# Patient Record
Sex: MALE | Race: WHITE | NOT HISPANIC OR LATINO | Employment: OTHER | URBAN - METROPOLITAN AREA
[De-identification: names, ages, dates, MRNs, and addresses within clinical notes are randomized per-mention and may not be internally consistent; named-entity substitution may affect disease eponyms.]

---

## 2023-01-01 ENCOUNTER — APPOINTMENT (OUTPATIENT)
Dept: RADIOLOGY | Facility: HOSPITAL | Age: 83
DRG: 641 | End: 2023-01-01
Payer: COMMERCIAL

## 2023-01-01 ENCOUNTER — NURSING HOME VISIT (OUTPATIENT)
Dept: POST ACUTE CARE | Facility: EXTERNAL LOCATION | Age: 83
End: 2023-01-01
Payer: COMMERCIAL

## 2023-01-01 ENCOUNTER — APPOINTMENT (OUTPATIENT)
Dept: GASTROENTEROLOGY | Facility: HOSPITAL | Age: 83
DRG: 641 | End: 2023-01-01
Payer: COMMERCIAL

## 2023-01-01 ENCOUNTER — LAB REQUISITION (OUTPATIENT)
Dept: LAB | Facility: HOSPITAL | Age: 83
End: 2023-01-01
Payer: COMMERCIAL

## 2023-01-01 ENCOUNTER — ANESTHESIA (OUTPATIENT)
Dept: GASTROENTEROLOGY | Facility: HOSPITAL | Age: 83
DRG: 641 | End: 2023-01-01
Payer: COMMERCIAL

## 2023-01-01 ENCOUNTER — HOSPITAL ENCOUNTER (INPATIENT)
Facility: HOSPITAL | Age: 83
LOS: 4 days | Discharge: NURSING, REHAB OR LONG-TERM CARE | DRG: 641 | End: 2023-12-10
Attending: EMERGENCY MEDICINE | Admitting: INTERNAL MEDICINE
Payer: COMMERCIAL

## 2023-01-01 ENCOUNTER — ANESTHESIA EVENT (OUTPATIENT)
Dept: GASTROENTEROLOGY | Facility: HOSPITAL | Age: 83
DRG: 641 | End: 2023-01-01
Payer: COMMERCIAL

## 2023-01-01 ENCOUNTER — APPOINTMENT (OUTPATIENT)
Dept: CARDIOLOGY | Facility: HOSPITAL | Age: 83
DRG: 641 | End: 2023-01-01
Payer: COMMERCIAL

## 2023-01-01 ENCOUNTER — NURSING HOME VISIT (OUTPATIENT)
Dept: POST ACUTE CARE | Facility: EXTERNAL LOCATION | Age: 83
End: 2023-01-01
Payer: MEDICARE

## 2023-01-01 ENCOUNTER — DOCUMENTATION (OUTPATIENT)
Dept: POST ACUTE CARE | Facility: EXTERNAL LOCATION | Age: 83
End: 2023-01-01
Payer: COMMERCIAL

## 2023-01-01 VITALS
DIASTOLIC BLOOD PRESSURE: 67 MMHG | HEIGHT: 70 IN | HEART RATE: 87 BPM | TEMPERATURE: 97.7 F | WEIGHT: 194 LBS | RESPIRATION RATE: 20 BRPM | SYSTOLIC BLOOD PRESSURE: 139 MMHG | OXYGEN SATURATION: 97 % | BODY MASS INDEX: 27.77 KG/M2

## 2023-01-01 DIAGNOSIS — M10.9 GOUT, UNSPECIFIED CAUSE, UNSPECIFIED CHRONICITY, UNSPECIFIED SITE: ICD-10-CM

## 2023-01-01 DIAGNOSIS — R62.7 FTT (FAILURE TO THRIVE) IN ADULT: Primary | ICD-10-CM

## 2023-01-01 DIAGNOSIS — I10 HYPERTENSION, UNSPECIFIED TYPE: Primary | ICD-10-CM

## 2023-01-01 DIAGNOSIS — I10 HYPERTENSION, UNSPECIFIED TYPE: ICD-10-CM

## 2023-01-01 DIAGNOSIS — I73.9 PVD (PERIPHERAL VASCULAR DISEASE) (CMS-HCC): ICD-10-CM

## 2023-01-01 DIAGNOSIS — R10.9 ABDOMINAL PAIN, UNSPECIFIED ABDOMINAL LOCATION: ICD-10-CM

## 2023-01-01 DIAGNOSIS — R62.7 FTT (FAILURE TO THRIVE) IN ADULT: ICD-10-CM

## 2023-01-01 DIAGNOSIS — E53.8 B12 DEFICIENCY: ICD-10-CM

## 2023-01-01 DIAGNOSIS — L03.119 CELLULITIS OF LOWER EXTREMITY, UNSPECIFIED LATERALITY: ICD-10-CM

## 2023-01-01 DIAGNOSIS — N18.9 CHRONIC KIDNEY DISEASE, UNSPECIFIED CKD STAGE: ICD-10-CM

## 2023-01-01 DIAGNOSIS — R63.4 ABNORMAL WEIGHT LOSS: Primary | ICD-10-CM

## 2023-01-01 DIAGNOSIS — N39.0 URINARY TRACT INFECTION WITHOUT HEMATURIA, SITE UNSPECIFIED: ICD-10-CM

## 2023-01-01 DIAGNOSIS — R13.10 DYSPHAGIA, UNSPECIFIED TYPE: Primary | ICD-10-CM

## 2023-01-01 DIAGNOSIS — R41.82 ALTERED MENTAL STATUS, UNSPECIFIED: ICD-10-CM

## 2023-01-01 DIAGNOSIS — I73.9 PVD (PERIPHERAL VASCULAR DISEASE) (CMS-HCC): Primary | ICD-10-CM
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
## 2023-01-01 DIAGNOSIS — E86.0 DEHYDRATION: Primary | ICD-10-CM

## 2023-01-01 DIAGNOSIS — R53.1 WEAKNESS: Primary | ICD-10-CM

## 2023-01-01 DIAGNOSIS — S81.812A NONINFECTED SKIN TEAR OF LOWER EXTREMITY, LEFT, INITIAL ENCOUNTER: ICD-10-CM

## 2023-01-01 DIAGNOSIS — M54.2 NECK PAIN: ICD-10-CM

## 2023-01-01 DIAGNOSIS — R50.9 FEVER, UNSPECIFIED FEVER CAUSE: ICD-10-CM

## 2023-01-01 DIAGNOSIS — R51.9 NONINTRACTABLE EPISODIC HEADACHE, UNSPECIFIED HEADACHE TYPE: ICD-10-CM

## 2023-01-01 DIAGNOSIS — L03.90 CELLULITIS, UNSPECIFIED CELLULITIS SITE: Primary | ICD-10-CM

## 2023-01-01 DIAGNOSIS — N17.9 ACUTE KIDNEY INJURY (CMS-HCC): ICD-10-CM

## 2023-01-01 DIAGNOSIS — R79.89 ELEVATED TROPONIN: ICD-10-CM

## 2023-01-01 DIAGNOSIS — S91.319A CUT OF FOOT: ICD-10-CM

## 2023-01-01 DIAGNOSIS — R62.7 FAILURE TO THRIVE IN ADULT: ICD-10-CM

## 2023-01-01 DIAGNOSIS — R53.1 WEAKNESS: ICD-10-CM

## 2023-01-01 DIAGNOSIS — N17.9 AKI (ACUTE KIDNEY INJURY) (CMS-HCC): Primary | ICD-10-CM

## 2023-01-01 DIAGNOSIS — K21.9 GASTROESOPHAGEAL REFLUX DISEASE WITHOUT ESOPHAGITIS: Primary | ICD-10-CM

## 2023-01-01 LAB
ALBUMIN SERPL BCP-MCNC: 2.6 G/DL (ref 3.4–5)
ALP SERPL-CCNC: 73 U/L (ref 33–136)
ALT SERPL W P-5'-P-CCNC: 14 U/L (ref 10–52)
ANION GAP IN SER/PLAS: 11 MMOL/L (ref 10–20)
ANION GAP SERPL CALC-SCNC: 10 MMOL/L (ref 10–20)
ANION GAP SERPL CALC-SCNC: 10 MMOL/L (ref 10–20)
ANION GAP SERPL CALC-SCNC: 12 MMOL/L (ref 10–20)
ANION GAP SERPL CALC-SCNC: 14 MMOL/L (ref 10–20)
ANION GAP SERPL CALC-SCNC: 14 MMOL/L (ref 10–20)
ANION GAP SERPL CALC-SCNC: 15 MMOL/L (ref 10–20)
ANION GAP SERPL CALC-SCNC: 15 MMOL/L (ref 10–20)
APPEARANCE UR: ABNORMAL
AST SERPL W P-5'-P-CCNC: 25 U/L (ref 9–39)
BACTERIA UR CULT: NO GROWTH
BASE EXCESS BLDV CALC-SCNC: -3.6 MMOL/L (ref -2–3)
BASOPHILS # BLD AUTO: 0.03 X10*3/UL (ref 0–0.1)
BASOPHILS # BLD AUTO: 0.06 X10*3/UL (ref 0–0.1)
BASOPHILS NFR BLD AUTO: 0.3 %
BASOPHILS NFR BLD AUTO: 0.7 %
BILIRUB SERPL-MCNC: 0.5 MG/DL (ref 0–1.2)
BILIRUB UR STRIP.AUTO-MCNC: NEGATIVE MG/DL
BODY TEMPERATURE: 37 DEGREES CELSIUS
BUN SERPL-MCNC: 24 MG/DL (ref 6–23)
BUN SERPL-MCNC: 29 MG/DL (ref 6–23)
BUN SERPL-MCNC: 41 MG/DL (ref 6–23)
BUN SERPL-MCNC: 54 MG/DL (ref 6–23)
BUN SERPL-MCNC: 58 MG/DL (ref 6–23)
BUN SERPL-MCNC: 61 MG/DL (ref 6–23)
BUN SERPL-MCNC: 63 MG/DL (ref 6–23)
CALCIUM (MG/DL) IN SER/PLAS: 9.7 MG/DL (ref 8.6–10.3)
CALCIUM SERPL-MCNC: 8 MG/DL (ref 8.6–10.3)
CALCIUM SERPL-MCNC: 8.1 MG/DL (ref 8.6–10.3)
CALCIUM SERPL-MCNC: 8.2 MG/DL (ref 8.6–10.3)
CALCIUM SERPL-MCNC: 8.6 MG/DL (ref 8.6–10.3)
CALCIUM SERPL-MCNC: 8.9 MG/DL (ref 8.6–10.3)
CALCIUM SERPL-MCNC: 9.2 MG/DL (ref 8.6–10.3)
CALCIUM SERPL-MCNC: 9.4 MG/DL (ref 8.6–10.3)
CARBON DIOXIDE, TOTAL (MMOL/L) IN SER/PLAS: 28 MMOL/L (ref 21–32)
CARDIAC TROPONIN I PNL SERPL HS: 60 NG/L (ref 0–20)
CARDIAC TROPONIN I PNL SERPL HS: 80 NG/L (ref 0–20)
CHLORIDE (MMOL/L) IN SER/PLAS: 103 MMOL/L (ref 98–107)
CHLORIDE SERPL-SCNC: 102 MMOL/L (ref 98–107)
CHLORIDE SERPL-SCNC: 106 MMOL/L (ref 98–107)
CHLORIDE SERPL-SCNC: 110 MMOL/L (ref 98–107)
CHLORIDE SERPL-SCNC: 113 MMOL/L (ref 98–107)
CHLORIDE SERPL-SCNC: 117 MMOL/L (ref 98–107)
CHLORIDE SERPL-SCNC: 118 MMOL/L (ref 98–107)
CHLORIDE SERPL-SCNC: 99 MMOL/L (ref 98–107)
CO2 SERPL-SCNC: 17 MMOL/L (ref 21–32)
CO2 SERPL-SCNC: 18 MMOL/L (ref 21–32)
CO2 SERPL-SCNC: 18 MMOL/L (ref 21–32)
CO2 SERPL-SCNC: 19 MMOL/L (ref 21–32)
CO2 SERPL-SCNC: 19 MMOL/L (ref 21–32)
CO2 SERPL-SCNC: 20 MMOL/L (ref 21–32)
CO2 SERPL-SCNC: 23 MMOL/L (ref 21–32)
COLOR UR: ABNORMAL
CREAT SERPL-MCNC: 1.54 MG/DL (ref 0.5–1.3)
CREAT SERPL-MCNC: 1.61 MG/DL (ref 0.5–1.3)
CREAT SERPL-MCNC: 1.91 MG/DL (ref 0.5–1.3)
CREAT SERPL-MCNC: 1.97 MG/DL (ref 0.5–1.3)
CREAT SERPL-MCNC: 2.07 MG/DL (ref 0.5–1.3)
CREAT SERPL-MCNC: 2.38 MG/DL (ref 0.5–1.3)
CREAT SERPL-MCNC: 2.49 MG/DL (ref 0.5–1.3)
CREATININE (MG/DL) IN SER/PLAS: 2.03 MG/DL (ref 0.5–1.3)
EOSINOPHIL # BLD AUTO: 0.08 X10*3/UL (ref 0–0.4)
EOSINOPHIL # BLD AUTO: 0.21 X10*3/UL (ref 0–0.4)
EOSINOPHIL NFR BLD AUTO: 0.9 %
EOSINOPHIL NFR BLD AUTO: 2.5 %
ERYTHROCYTE DISTRIBUTION WIDTH (RATIO) BY AUTOMATED COUNT: 14.6 % (ref 11.5–14.5)
ERYTHROCYTE DISTRIBUTION WIDTH (RATIO) BY AUTOMATED COUNT: 14.6 % (ref 11.5–14.5)
ERYTHROCYTE MEAN CORPUSCULAR HEMOGLOBIN CONCENTRATION (G/DL) BY AUTOMATED: 30.9 G/DL (ref 32–36)
ERYTHROCYTE MEAN CORPUSCULAR HEMOGLOBIN CONCENTRATION (G/DL) BY AUTOMATED: 31.5 G/DL (ref 32–36)
ERYTHROCYTE MEAN CORPUSCULAR VOLUME (FL) BY AUTOMATED COUNT: 85 FL (ref 80–100)
ERYTHROCYTE MEAN CORPUSCULAR VOLUME (FL) BY AUTOMATED COUNT: 91 FL (ref 80–100)
ERYTHROCYTE [DISTWIDTH] IN BLOOD BY AUTOMATED COUNT: 14.8 % (ref 11.5–14.5)
ERYTHROCYTE [DISTWIDTH] IN BLOOD BY AUTOMATED COUNT: 15.8 % (ref 11.5–14.5)
ERYTHROCYTE [DISTWIDTH] IN BLOOD BY AUTOMATED COUNT: 15.8 % (ref 11.5–14.5)
ERYTHROCYTE [DISTWIDTH] IN BLOOD BY AUTOMATED COUNT: 16.1 % (ref 11.5–14.5)
ERYTHROCYTE [DISTWIDTH] IN BLOOD BY AUTOMATED COUNT: 16.2 % (ref 11.5–14.5)
ERYTHROCYTE [DISTWIDTH] IN BLOOD BY AUTOMATED COUNT: 16.3 % (ref 11.5–14.5)
ERYTHROCYTES (10*6/UL) IN BLOOD BY AUTOMATED COUNT: 3.12 X10E12/L (ref 4.5–5.9)
ERYTHROCYTES (10*6/UL) IN BLOOD BY AUTOMATED COUNT: 3.7 X10E12/L (ref 4.5–5.9)
GFR MALE: 32 ML/MIN/1.73M2
GFR SERPL CREATININE-BSD FRML MDRD: 25 ML/MIN/1.73M*2
GFR SERPL CREATININE-BSD FRML MDRD: 26 ML/MIN/1.73M*2
GFR SERPL CREATININE-BSD FRML MDRD: 31 ML/MIN/1.73M*2
GFR SERPL CREATININE-BSD FRML MDRD: 33 ML/MIN/1.73M*2
GFR SERPL CREATININE-BSD FRML MDRD: 34 ML/MIN/1.73M*2
GFR SERPL CREATININE-BSD FRML MDRD: 42 ML/MIN/1.73M*2
GFR SERPL CREATININE-BSD FRML MDRD: 44 ML/MIN/1.73M*2
GLUCOSE (MG/DL) IN SER/PLAS: 128 MG/DL (ref 74–99)
GLUCOSE BLD MANUAL STRIP-MCNC: 73 MG/DL (ref 74–99)
GLUCOSE BLD MANUAL STRIP-MCNC: 75 MG/DL (ref 74–99)
GLUCOSE BLD MANUAL STRIP-MCNC: 98 MG/DL (ref 74–99)
GLUCOSE SERPL-MCNC: 100 MG/DL (ref 74–99)
GLUCOSE SERPL-MCNC: 110 MG/DL (ref 74–99)
GLUCOSE SERPL-MCNC: 110 MG/DL (ref 74–99)
GLUCOSE SERPL-MCNC: 112 MG/DL (ref 74–99)
GLUCOSE SERPL-MCNC: 117 MG/DL (ref 74–99)
GLUCOSE SERPL-MCNC: 121 MG/DL (ref 74–99)
GLUCOSE SERPL-MCNC: 85 MG/DL (ref 74–99)
GLUCOSE UR STRIP.AUTO-MCNC: NEGATIVE MG/DL
HCO3 BLDV-SCNC: 20.6 MMOL/L (ref 22–26)
HCT VFR BLD AUTO: 26.9 % (ref 41–52)
HCT VFR BLD AUTO: 27.4 % (ref 41–52)
HCT VFR BLD AUTO: 27.5 % (ref 41–52)
HCT VFR BLD AUTO: 28.8 % (ref 41–52)
HCT VFR BLD AUTO: 29 % (ref 41–52)
HCT VFR BLD AUTO: 31.7 % (ref 41–52)
HEMATOCRIT (%) IN BLOOD BY AUTOMATED COUNT: 26.5 % (ref 41–52)
HEMATOCRIT (%) IN BLOOD BY AUTOMATED COUNT: 33.6 % (ref 41–52)
HEMOGLOBIN (G/DL) IN BLOOD: 10.6 G/DL (ref 13.5–17.5)
HEMOGLOBIN (G/DL) IN BLOOD: 8.2 G/DL (ref 13.5–17.5)
HGB BLD-MCNC: 8.4 G/DL (ref 13.5–17.5)
HGB BLD-MCNC: 8.7 G/DL (ref 13.5–17.5)
HGB BLD-MCNC: 8.7 G/DL (ref 13.5–17.5)
HGB BLD-MCNC: 9 G/DL (ref 13.5–17.5)
HGB BLD-MCNC: 9.2 G/DL (ref 13.5–17.5)
HGB BLD-MCNC: 9.8 G/DL (ref 13.5–17.5)
HOLD SPECIMEN: NORMAL
IMM GRANULOCYTES # BLD AUTO: 0.06 X10*3/UL (ref 0–0.5)
IMM GRANULOCYTES # BLD AUTO: 0.09 X10*3/UL (ref 0–0.5)
IMM GRANULOCYTES NFR BLD AUTO: 0.7 % (ref 0–0.9)
IMM GRANULOCYTES NFR BLD AUTO: 1.1 % (ref 0–0.9)
INHALED O2 CONCENTRATION: 21 %
KETONES UR STRIP.AUTO-MCNC: NEGATIVE MG/DL
LACTATE SERPL-SCNC: 1.1 MMOL/L (ref 0.4–2)
LEUKOCYTE ESTERASE UR QL STRIP.AUTO: ABNORMAL
LEUKOCYTES (10*3/UL) IN BLOOD BY AUTOMATED COUNT: 5.3 X10E9/L (ref 4.4–11.3)
LEUKOCYTES (10*3/UL) IN BLOOD BY AUTOMATED COUNT: 9.9 X10E9/L (ref 4.4–11.3)
LIPASE SERPL-CCNC: 36 U/L (ref 9–82)
LYMPHOCYTES # BLD AUTO: 0.81 X10*3/UL (ref 0.8–3)
LYMPHOCYTES # BLD AUTO: 1.13 X10*3/UL (ref 0.8–3)
LYMPHOCYTES NFR BLD AUTO: 13.5 %
LYMPHOCYTES NFR BLD AUTO: 9.4 %
MAGNESIUM SERPL-MCNC: 1.37 MG/DL (ref 1.6–2.4)
MAGNESIUM SERPL-MCNC: 1.43 MG/DL (ref 1.6–2.4)
MCH RBC QN AUTO: 26.4 PG (ref 26–34)
MCH RBC QN AUTO: 26.8 PG (ref 26–34)
MCH RBC QN AUTO: 26.8 PG (ref 26–34)
MCH RBC QN AUTO: 26.9 PG (ref 26–34)
MCH RBC QN AUTO: 26.9 PG (ref 26–34)
MCH RBC QN AUTO: 27 PG (ref 26–34)
MCHC RBC AUTO-ENTMCNC: 30.9 G/DL (ref 32–36)
MCHC RBC AUTO-ENTMCNC: 31.2 G/DL (ref 32–36)
MCHC RBC AUTO-ENTMCNC: 31.3 G/DL (ref 32–36)
MCHC RBC AUTO-ENTMCNC: 31.6 G/DL (ref 32–36)
MCHC RBC AUTO-ENTMCNC: 31.7 G/DL (ref 32–36)
MCHC RBC AUTO-ENTMCNC: 31.8 G/DL (ref 32–36)
MCV RBC AUTO: 84 FL (ref 80–100)
MCV RBC AUTO: 85 FL (ref 80–100)
MCV RBC AUTO: 85 FL (ref 80–100)
MCV RBC AUTO: 86 FL (ref 80–100)
MCV RBC AUTO: 86 FL (ref 80–100)
MCV RBC AUTO: 87 FL (ref 80–100)
MONOCYTES # BLD AUTO: 0.73 X10*3/UL (ref 0.05–0.8)
MONOCYTES # BLD AUTO: 0.9 X10*3/UL (ref 0.05–0.8)
MONOCYTES NFR BLD AUTO: 10.4 %
MONOCYTES NFR BLD AUTO: 8.7 %
NEUTROPHILS # BLD AUTO: 6.15 X10*3/UL (ref 1.6–5.5)
NEUTROPHILS # BLD AUTO: 6.75 X10*3/UL (ref 1.6–5.5)
NEUTROPHILS NFR BLD AUTO: 73.5 %
NEUTROPHILS NFR BLD AUTO: 78.3 %
NITRITE UR QL STRIP.AUTO: NEGATIVE
NRBC (PER 100 WBCS) BY AUTOMATED COUNT: 0 /100 WBC (ref 0–0)
NRBC (PER 100 WBCS) BY AUTOMATED COUNT: 0.3 /100 WBC (ref 0–0)
NRBC BLD-RTO: 0 /100 WBCS (ref 0–0)
OXYHGB MFR BLDV: 47.5 % (ref 45–75)
PCO2 BLDV: 34 MM HG (ref 41–51)
PH BLDV: 7.39 PH (ref 7.33–7.43)
PH UR STRIP.AUTO: 6 [PH]
PHOSPHATE SERPL-MCNC: 2.1 MG/DL (ref 2.5–4.9)
PHOSPHATE SERPL-MCNC: 2.3 MG/DL (ref 2.5–4.9)
PLATELET # BLD AUTO: 222 X10*3/UL (ref 150–450)
PLATELET # BLD AUTO: 240 X10*3/UL (ref 150–450)
PLATELET # BLD AUTO: 248 X10*3/UL (ref 150–450)
PLATELET # BLD AUTO: 260 X10*3/UL (ref 150–450)
PLATELET # BLD AUTO: 270 X10*3/UL (ref 150–450)
PLATELET # BLD AUTO: 287 X10*3/UL (ref 150–450)
PLATELETS (10*3/UL) IN BLOOD AUTOMATED COUNT: 233 X10E9/L (ref 150–450)
PLATELETS (10*3/UL) IN BLOOD AUTOMATED COUNT: 300 X10E9/L (ref 150–450)
PO2 BLDV: 27 MM HG (ref 35–45)
POTASSIUM (MMOL/L) IN SER/PLAS: 4 MMOL/L (ref 3.5–5.3)
POTASSIUM SERPL-SCNC: 2.9 MMOL/L (ref 3.5–5.3)
POTASSIUM SERPL-SCNC: 3.3 MMOL/L (ref 3.5–5.3)
POTASSIUM SERPL-SCNC: 3.3 MMOL/L (ref 3.5–5.3)
POTASSIUM SERPL-SCNC: 3.4 MMOL/L (ref 3.5–5.3)
POTASSIUM SERPL-SCNC: 3.6 MMOL/L (ref 3.5–5.3)
POTASSIUM SERPL-SCNC: 3.9 MMOL/L (ref 3.5–5.3)
POTASSIUM SERPL-SCNC: 4.8 MMOL/L (ref 3.5–5.3)
PROT SERPL-MCNC: 6.1 G/DL (ref 6.4–8.2)
PROT UR STRIP.AUTO-MCNC: ABNORMAL MG/DL
RBC # BLD AUTO: 3.13 X10*6/UL (ref 4.5–5.9)
RBC # BLD AUTO: 3.24 X10*6/UL (ref 4.5–5.9)
RBC # BLD AUTO: 3.29 X10*6/UL (ref 4.5–5.9)
RBC # BLD AUTO: 3.35 X10*6/UL (ref 4.5–5.9)
RBC # BLD AUTO: 3.41 X10*6/UL (ref 4.5–5.9)
RBC # BLD AUTO: 3.65 X10*6/UL (ref 4.5–5.9)
RBC # UR STRIP.AUTO: ABNORMAL /UL
RBC #/AREA URNS AUTO: >20 /HPF
SAO2 % BLDV: 49 % (ref 45–75)
SARS-COV-2 RNA RESP QL NAA+PROBE: NOT DETECTED
SODIUM (MMOL/L) IN SER/PLAS: 138 MMOL/L (ref 136–145)
SODIUM SERPL-SCNC: 129 MMOL/L (ref 136–145)
SODIUM SERPL-SCNC: 131 MMOL/L (ref 136–145)
SODIUM SERPL-SCNC: 135 MMOL/L (ref 136–145)
SODIUM SERPL-SCNC: 137 MMOL/L (ref 136–145)
SODIUM SERPL-SCNC: 143 MMOL/L (ref 136–145)
SODIUM SERPL-SCNC: 143 MMOL/L (ref 136–145)
SODIUM SERPL-SCNC: 146 MMOL/L (ref 136–145)
SP GR UR STRIP.AUTO: 1.02
TEST COMMENT: ABNORMAL
UREA NITROGEN (MG/DL) IN SER/PLAS: 42 MG/DL (ref 6–23)
UROBILINOGEN UR STRIP.AUTO-MCNC: ABNORMAL MG/DL
WBC # BLD AUTO: 6.9 X10*3/UL (ref 4.4–11.3)
WBC # BLD AUTO: 7.3 X10*3/UL (ref 4.4–11.3)
WBC # BLD AUTO: 7.4 X10*3/UL (ref 4.4–11.3)
WBC # BLD AUTO: 8 X10*3/UL (ref 4.4–11.3)
WBC # BLD AUTO: 8.4 X10*3/UL (ref 4.4–11.3)
WBC # BLD AUTO: 8.6 X10*3/UL (ref 4.4–11.3)
WBC #/AREA URNS AUTO: ABNORMAL /HPF

## 2023-01-01 PROCEDURE — 3700000001 HC GENERAL ANESTHESIA TIME - INITIAL BASE CHARGE: Performed by: SURGERY

## 2023-01-01 PROCEDURE — 87635 SARS-COV-2 COVID-19 AMP PRB: CPT | Performed by: EMERGENCY MEDICINE

## 2023-01-01 PROCEDURE — 96361 HYDRATE IV INFUSION ADD-ON: CPT

## 2023-01-01 PROCEDURE — 72125 CT NECK SPINE W/O DYE: CPT | Performed by: RADIOLOGY

## 2023-01-01 PROCEDURE — 80048 BASIC METABOLIC PNL TOTAL CA: CPT | Performed by: INTERNAL MEDICINE

## 2023-01-01 PROCEDURE — 81001 URINALYSIS AUTO W/SCOPE: CPT | Performed by: EMERGENCY MEDICINE

## 2023-01-01 PROCEDURE — 2500000004 HC RX 250 GENERAL PHARMACY W/ HCPCS (ALT 636 FOR OP/ED): Performed by: INTERNAL MEDICINE

## 2023-01-01 PROCEDURE — 0DH63UZ INSERTION OF FEEDING DEVICE INTO STOMACH, PERCUTANEOUS APPROACH: ICD-10-PCS | Performed by: SURGERY

## 2023-01-01 PROCEDURE — 99222 1ST HOSP IP/OBS MODERATE 55: CPT

## 2023-01-01 PROCEDURE — 99308 SBSQ NF CARE LOW MDM 20: CPT | Performed by: INTERNAL MEDICINE

## 2023-01-01 PROCEDURE — 2500000004 HC RX 250 GENERAL PHARMACY W/ HCPCS (ALT 636 FOR OP/ED): Performed by: PHYSICIAN ASSISTANT

## 2023-01-01 PROCEDURE — 99309 SBSQ NF CARE MODERATE MDM 30: CPT | Performed by: INTERNAL MEDICINE

## 2023-01-01 PROCEDURE — 3700000002 HC GENERAL ANESTHESIA TIME - EACH INCREMENTAL 1 MINUTE: Performed by: SURGERY

## 2023-01-01 PROCEDURE — A43239 PR EDG TRANSORAL BIOPSY SINGLE/MULTIPLE: Performed by: ANESTHESIOLOGY

## 2023-01-01 PROCEDURE — 7100000002 HC RECOVERY ROOM TIME - EACH INCREMENTAL 1 MINUTE: Performed by: SURGERY

## 2023-01-01 PROCEDURE — 99232 SBSQ HOSP IP/OBS MODERATE 35: CPT | Performed by: INTERNAL MEDICINE

## 2023-01-01 PROCEDURE — 87086 URINE CULTURE/COLONY COUNT: CPT | Mod: PARLAB | Performed by: EMERGENCY MEDICINE

## 2023-01-01 PROCEDURE — 99100 ANES PT EXTEME AGE<1 YR&>70: CPT | Performed by: ANESTHESIOLOGY

## 2023-01-01 PROCEDURE — 36415 COLL VENOUS BLD VENIPUNCTURE: CPT | Performed by: INTERNAL MEDICINE

## 2023-01-01 PROCEDURE — 43246 EGD PLACE GASTROSTOMY TUBE: CPT | Performed by: SURGERY

## 2023-01-01 PROCEDURE — 82947 ASSAY GLUCOSE BLOOD QUANT: CPT

## 2023-01-01 PROCEDURE — 2500000005 HC RX 250 GENERAL PHARMACY W/O HCPCS: Performed by: PHYSICIAN ASSISTANT

## 2023-01-01 PROCEDURE — 70450 CT HEAD/BRAIN W/O DYE: CPT

## 2023-01-01 PROCEDURE — 99233 SBSQ HOSP IP/OBS HIGH 50: CPT | Performed by: INTERNAL MEDICINE

## 2023-01-01 PROCEDURE — 2500000005 HC RX 250 GENERAL PHARMACY W/O HCPCS: Performed by: EMERGENCY MEDICINE

## 2023-01-01 PROCEDURE — 85025 COMPLETE CBC W/AUTO DIFF WBC: CPT

## 2023-01-01 PROCEDURE — 2500000001 HC RX 250 WO HCPCS SELF ADMINISTERED DRUGS (ALT 637 FOR MEDICARE OP): Performed by: PHYSICIAN ASSISTANT

## 2023-01-01 PROCEDURE — 83735 ASSAY OF MAGNESIUM: CPT | Performed by: INTERNAL MEDICINE

## 2023-01-01 PROCEDURE — 83690 ASSAY OF LIPASE: CPT | Performed by: EMERGENCY MEDICINE

## 2023-01-01 PROCEDURE — 93005 ELECTROCARDIOGRAM TRACING: CPT

## 2023-01-01 PROCEDURE — 2060000001 HC INTERMEDIATE ICU ROOM DAILY

## 2023-01-01 PROCEDURE — 2780000003 HC OR 278 NO HCPCS: Performed by: SURGERY

## 2023-01-01 PROCEDURE — 84484 ASSAY OF TROPONIN QUANT: CPT | Performed by: EMERGENCY MEDICINE

## 2023-01-01 PROCEDURE — 99222 1ST HOSP IP/OBS MODERATE 55: CPT | Performed by: PHYSICIAN ASSISTANT

## 2023-01-01 PROCEDURE — 99222 1ST HOSP IP/OBS MODERATE 55: CPT | Performed by: INTERNAL MEDICINE

## 2023-01-01 PROCEDURE — 85027 COMPLETE CBC AUTOMATED: CPT | Performed by: INTERNAL MEDICINE

## 2023-01-01 PROCEDURE — 2500000001 HC RX 250 WO HCPCS SELF ADMINISTERED DRUGS (ALT 637 FOR MEDICARE OP): Performed by: INTERNAL MEDICINE

## 2023-01-01 PROCEDURE — 99308 SBSQ NF CARE LOW MDM 20: CPT | Performed by: REGISTERED NURSE

## 2023-01-01 PROCEDURE — 96375 TX/PRO/DX INJ NEW DRUG ADDON: CPT

## 2023-01-01 PROCEDURE — 96376 TX/PRO/DX INJ SAME DRUG ADON: CPT

## 2023-01-01 PROCEDURE — 2500000004 HC RX 250 GENERAL PHARMACY W/ HCPCS (ALT 636 FOR OP/ED): Performed by: EMERGENCY MEDICINE

## 2023-01-01 PROCEDURE — 83605 ASSAY OF LACTIC ACID: CPT | Performed by: EMERGENCY MEDICINE

## 2023-01-01 PROCEDURE — 71045 X-RAY EXAM CHEST 1 VIEW: CPT | Mod: FY

## 2023-01-01 PROCEDURE — G0378 HOSPITAL OBSERVATION PER HR: HCPCS

## 2023-01-01 PROCEDURE — 84132 ASSAY OF SERUM POTASSIUM: CPT | Performed by: INTERNAL MEDICINE

## 2023-01-01 PROCEDURE — 43246 EGD PLACE GASTROSTOMY TUBE: CPT | Performed by: STUDENT IN AN ORGANIZED HEALTH CARE EDUCATION/TRAINING PROGRAM

## 2023-01-01 PROCEDURE — 85027 COMPLETE CBC AUTOMATED: CPT | Performed by: PHYSICIAN ASSISTANT

## 2023-01-01 PROCEDURE — 71045 X-RAY EXAM CHEST 1 VIEW: CPT | Performed by: RADIOLOGY

## 2023-01-01 PROCEDURE — 96365 THER/PROPH/DIAG IV INF INIT: CPT

## 2023-01-01 PROCEDURE — 74176 CT ABD & PELVIS W/O CONTRAST: CPT | Performed by: RADIOLOGY

## 2023-01-01 PROCEDURE — 2500000004 HC RX 250 GENERAL PHARMACY W/ HCPCS (ALT 636 FOR OP/ED): Performed by: ANESTHESIOLOGIST ASSISTANT

## 2023-01-01 PROCEDURE — A43239 PR EDG TRANSORAL BIOPSY SINGLE/MULTIPLE: Performed by: ANESTHESIOLOGIST ASSISTANT

## 2023-01-01 PROCEDURE — 2500000005 HC RX 250 GENERAL PHARMACY W/O HCPCS: Performed by: ANESTHESIOLOGIST ASSISTANT

## 2023-01-01 PROCEDURE — 96366 THER/PROPH/DIAG IV INF ADDON: CPT

## 2023-01-01 PROCEDURE — 80053 COMPREHEN METABOLIC PANEL: CPT | Performed by: EMERGENCY MEDICINE

## 2023-01-01 PROCEDURE — 85025 COMPLETE CBC W/AUTO DIFF WBC: CPT | Performed by: EMERGENCY MEDICINE

## 2023-01-01 PROCEDURE — 7100000001 HC RECOVERY ROOM TIME - INITIAL BASE CHARGE: Performed by: SURGERY

## 2023-01-01 PROCEDURE — 92610 EVALUATE SWALLOWING FUNCTION: CPT | Mod: GN

## 2023-01-01 PROCEDURE — 36415 COLL VENOUS BLD VENIPUNCTURE: CPT | Performed by: EMERGENCY MEDICINE

## 2023-01-01 PROCEDURE — 74176 CT ABD & PELVIS W/O CONTRAST: CPT

## 2023-01-01 PROCEDURE — 2720000007 HC OR 272 NO HCPCS: Performed by: SURGERY

## 2023-01-01 PROCEDURE — 70450 CT HEAD/BRAIN W/O DYE: CPT | Performed by: RADIOLOGY

## 2023-01-01 PROCEDURE — 99231 SBSQ HOSP IP/OBS SF/LOW 25: CPT

## 2023-01-01 PROCEDURE — 82805 BLOOD GASES W/O2 SATURATION: CPT | Performed by: EMERGENCY MEDICINE

## 2023-01-01 PROCEDURE — 72125 CT NECK SPINE W/O DYE: CPT

## 2023-01-01 PROCEDURE — 80048 BASIC METABOLIC PNL TOTAL CA: CPT | Performed by: PHYSICIAN ASSISTANT

## 2023-01-01 PROCEDURE — 80048 BASIC METABOLIC PNL TOTAL CA: CPT

## 2023-01-01 PROCEDURE — 84100 ASSAY OF PHOSPHORUS: CPT | Performed by: INTERNAL MEDICINE

## 2023-01-01 PROCEDURE — 99239 HOSP IP/OBS DSCHRG MGMT >30: CPT | Performed by: INTERNAL MEDICINE

## 2023-01-01 PROCEDURE — 2500000004 HC RX 250 GENERAL PHARMACY W/ HCPCS (ALT 636 FOR OP/ED)

## 2023-01-01 PROCEDURE — 99285 EMERGENCY DEPT VISIT HI MDM: CPT | Mod: 25 | Performed by: EMERGENCY MEDICINE

## 2023-01-01 PROCEDURE — C1713 ANCHOR/SCREW BN/BN,TIS/BN: HCPCS | Performed by: SURGERY

## 2023-01-01 PROCEDURE — 99232 SBSQ HOSP IP/OBS MODERATE 35: CPT

## 2023-01-01 RX ORDER — SODIUM CHLORIDE 9 MG/ML
100 INJECTION, SOLUTION INTRAVENOUS CONTINUOUS
Status: DISCONTINUED | OUTPATIENT
Start: 2023-01-01 | End: 2023-01-01

## 2023-01-01 RX ORDER — ACETAMINOPHEN 325 MG/1
650 TABLET ORAL EVERY 4 HOURS PRN
Status: DISCONTINUED | OUTPATIENT
Start: 2023-01-01 | End: 2023-01-01 | Stop reason: HOSPADM

## 2023-01-01 RX ORDER — LIDOCAINE HCL/PF 100 MG/5ML
SYRINGE (ML) INTRAVENOUS AS NEEDED
Status: DISCONTINUED | OUTPATIENT
Start: 2023-01-01 | End: 2023-01-01

## 2023-01-01 RX ORDER — BALSAM PERU/CASTOR OIL
OINTMENT (GRAM) TOPICAL 2 TIMES DAILY
Status: DISCONTINUED | OUTPATIENT
Start: 2023-01-01 | End: 2023-01-01 | Stop reason: HOSPADM

## 2023-01-01 RX ORDER — POTASSIUM CHLORIDE 20 MEQ/1
20 TABLET, EXTENDED RELEASE ORAL ONCE
Status: DISCONTINUED | OUTPATIENT
Start: 2023-01-01 | End: 2023-01-01

## 2023-01-01 RX ORDER — ACETAMINOPHEN 500 MG
5 TABLET ORAL NIGHTLY PRN
Status: DISCONTINUED | OUTPATIENT
Start: 2023-01-01 | End: 2023-01-01 | Stop reason: HOSPADM

## 2023-01-01 RX ORDER — FAMOTIDINE 20 MG/1
20 TABLET, FILM COATED ORAL DAILY
Status: DISCONTINUED | OUTPATIENT
Start: 2023-01-01 | End: 2023-01-01

## 2023-01-01 RX ORDER — ACETAMINOPHEN 500 MG
5 TABLET ORAL NIGHTLY PRN
COMMUNITY

## 2023-01-01 RX ORDER — PSEUDOEPHEDRINE HCL 120 MG/1
120 TABLET, FILM COATED, EXTENDED RELEASE ORAL DAILY
COMMUNITY
End: 2023-01-01 | Stop reason: HOSPADM

## 2023-01-01 RX ORDER — DOCUSATE SODIUM 50 MG/5ML
100 LIQUID ORAL 2 TIMES DAILY
Status: DISCONTINUED | OUTPATIENT
Start: 2023-01-01 | End: 2023-01-01 | Stop reason: HOSPADM

## 2023-01-01 RX ORDER — TAZOBACTAM SODIUM AND PIPERACILLIN SODIUM 250; 2 MG/50ML; G/50ML
2.25 INJECTION, SOLUTION INTRAVENOUS EVERY 8 HOURS
COMMUNITY
Start: 2023-01-01 | End: 2023-01-01

## 2023-01-01 RX ORDER — ACETAMINOPHEN 325 MG/1
650 TABLET ORAL EVERY 4 HOURS PRN
COMMUNITY

## 2023-01-01 RX ORDER — FAMOTIDINE 20 MG/1
20 TABLET, FILM COATED ORAL DAILY
COMMUNITY
Start: 2022-08-28

## 2023-01-01 RX ORDER — MORPHINE SULFATE 2 MG/ML
2 INJECTION, SOLUTION INTRAMUSCULAR; INTRAVENOUS ONCE
Status: COMPLETED | OUTPATIENT
Start: 2023-01-01 | End: 2023-01-01

## 2023-01-01 RX ORDER — DOCUSATE SODIUM 100 MG/1
100 CAPSULE, LIQUID FILLED ORAL 2 TIMES DAILY
Status: DISCONTINUED | OUTPATIENT
Start: 2023-01-01 | End: 2023-01-01

## 2023-01-01 RX ORDER — BISACODYL 10 MG/1
10 SUPPOSITORY RECTAL DAILY PRN
Status: DISCONTINUED | OUTPATIENT
Start: 2023-01-01 | End: 2023-01-01 | Stop reason: HOSPADM

## 2023-01-01 RX ORDER — FEXOFENADINE HCL 60 MG
60 TABLET ORAL DAILY
COMMUNITY

## 2023-01-01 RX ORDER — TRAMADOL HYDROCHLORIDE 50 MG/1
50 TABLET ORAL EVERY 6 HOURS PRN
COMMUNITY

## 2023-01-01 RX ORDER — POTASSIUM CHLORIDE 1.5 G/1.58G
40 POWDER, FOR SOLUTION ORAL ONCE
Status: COMPLETED | OUTPATIENT
Start: 2023-01-01 | End: 2023-01-01

## 2023-01-01 RX ORDER — POTASSIUM CHLORIDE 1.5 G/1.58G
20 POWDER, FOR SOLUTION ORAL ONCE
Status: COMPLETED | OUTPATIENT
Start: 2023-01-01 | End: 2023-01-01

## 2023-01-01 RX ORDER — METOPROLOL TARTRATE 1 MG/ML
5 INJECTION, SOLUTION INTRAVENOUS EVERY 6 HOURS
Status: DISCONTINUED | OUTPATIENT
Start: 2023-01-01 | End: 2023-01-01

## 2023-01-01 RX ORDER — LIDOCAINE 560 MG/1
1 PATCH PERCUTANEOUS; TOPICAL; TRANSDERMAL EVERY 8 HOURS
Status: DISCONTINUED | OUTPATIENT
Start: 2023-01-01 | End: 2023-01-01

## 2023-01-01 RX ORDER — POLYETHYLENE GLYCOL 3350 17 G/17G
17 POWDER, FOR SOLUTION ORAL DAILY PRN
Status: DISCONTINUED | OUTPATIENT
Start: 2023-01-01 | End: 2023-01-01 | Stop reason: HOSPADM

## 2023-01-01 RX ORDER — POTASSIUM CHLORIDE 14.9 MG/ML
20 INJECTION INTRAVENOUS ONCE
Status: COMPLETED | OUTPATIENT
Start: 2023-01-01 | End: 2023-01-01

## 2023-01-01 RX ORDER — BALSAM PERU/CASTOR OIL
OINTMENT (GRAM) TOPICAL 2 TIMES DAILY
Start: 2023-01-01 | End: 2024-02-01

## 2023-01-01 RX ORDER — DEXTROMETHORPHAN HYDROBROMIDE, GUAIFENESIN 5; 100 MG/5ML; MG/5ML
1300 LIQUID ORAL 2 TIMES DAILY
COMMUNITY

## 2023-01-01 RX ORDER — METOPROLOL TARTRATE 25 MG/1
25 TABLET, FILM COATED ORAL 2 TIMES DAILY
Status: DISCONTINUED | OUTPATIENT
Start: 2023-01-01 | End: 2023-01-01 | Stop reason: HOSPADM

## 2023-01-01 RX ORDER — LIDOCAINE 560 MG/1
1 PATCH PERCUTANEOUS; TOPICAL; TRANSDERMAL DAILY
Status: DISCONTINUED | OUTPATIENT
Start: 2023-01-01 | End: 2023-01-01 | Stop reason: HOSPADM

## 2023-01-01 RX ORDER — DEXTROSE MONOHYDRATE 50 MG/ML
100 INJECTION, SOLUTION INTRAVENOUS CONTINUOUS
Status: DISCONTINUED | OUTPATIENT
Start: 2023-01-01 | End: 2023-01-01

## 2023-01-01 RX ORDER — ADHESIVE BANDAGE
30 BANDAGE TOPICAL NIGHTLY PRN
COMMUNITY

## 2023-01-01 RX ORDER — DOCUSATE SODIUM 100 MG/1
1 CAPSULE, LIQUID FILLED ORAL 2 TIMES DAILY
COMMUNITY

## 2023-01-01 RX ORDER — BISACODYL 10 MG/1
10 SUPPOSITORY RECTAL DAILY PRN
COMMUNITY

## 2023-01-01 RX ORDER — PROPOFOL 10 MG/ML
INJECTION, EMULSION INTRAVENOUS CONTINUOUS PRN
Status: DISCONTINUED | OUTPATIENT
Start: 2023-01-01 | End: 2023-01-01

## 2023-01-01 RX ORDER — METOPROLOL TARTRATE 25 MG/1
25 TABLET, FILM COATED ORAL 2 TIMES DAILY
COMMUNITY
Start: 2022-08-18

## 2023-01-01 RX ORDER — ADHESIVE BANDAGE
30 BANDAGE TOPICAL NIGHTLY PRN
Status: DISCONTINUED | OUTPATIENT
Start: 2023-01-01 | End: 2023-01-01 | Stop reason: HOSPADM

## 2023-01-01 RX ORDER — CLOPIDOGREL BISULFATE 75 MG/1
75 TABLET ORAL DAILY
Status: DISCONTINUED | OUTPATIENT
Start: 2023-01-01 | End: 2023-01-01 | Stop reason: HOSPADM

## 2023-01-01 RX ORDER — TRAMADOL HYDROCHLORIDE 50 MG/1
50 TABLET ORAL EVERY 6 HOURS PRN
Status: DISCONTINUED | OUTPATIENT
Start: 2023-01-01 | End: 2023-01-01 | Stop reason: HOSPADM

## 2023-01-01 RX ORDER — METOPROLOL TARTRATE 25 MG/1
25 TABLET, FILM COATED ORAL 2 TIMES DAILY
Status: DISCONTINUED | OUTPATIENT
Start: 2023-01-01 | End: 2023-01-01

## 2023-01-01 RX ORDER — FAMOTIDINE 20 MG/1
20 TABLET, FILM COATED ORAL DAILY
Status: DISCONTINUED | OUTPATIENT
Start: 2023-01-01 | End: 2023-01-01 | Stop reason: HOSPADM

## 2023-01-01 RX ORDER — FAMOTIDINE 10 MG/ML
20 INJECTION INTRAVENOUS DAILY
Status: DISCONTINUED | OUTPATIENT
Start: 2023-01-01 | End: 2023-01-01 | Stop reason: HOSPADM

## 2023-01-01 RX ORDER — ACETAMINOPHEN 160 MG/5ML
650 SOLUTION ORAL EVERY 4 HOURS PRN
Status: DISCONTINUED | OUTPATIENT
Start: 2023-01-01 | End: 2023-01-01 | Stop reason: HOSPADM

## 2023-01-01 RX ORDER — LIDOCAINE 560 MG/1
1 PATCH PERCUTANEOUS; TOPICAL; TRANSDERMAL EVERY 8 HOURS
COMMUNITY
Start: 2023-01-01

## 2023-01-01 RX ORDER — CLOPIDOGREL BISULFATE 75 MG/1
75 TABLET ORAL DAILY
COMMUNITY
Start: 2022-08-22

## 2023-01-01 RX ORDER — POLYETHYLENE GLYCOL 3350 17 G/17G
17 POWDER, FOR SOLUTION ORAL DAILY PRN
COMMUNITY

## 2023-01-01 RX ORDER — ASCORBIC ACID 500 MG
500 TABLET ORAL DAILY
COMMUNITY

## 2023-01-01 RX ADMIN — METOPROLOL TARTRATE 5 MG: 5 INJECTION INTRAVENOUS at 09:09

## 2023-01-01 RX ADMIN — PIPERACILLIN SODIUM AND TAZOBACTAM SODIUM 2.25 G: 2; .25 INJECTION, SOLUTION INTRAVENOUS at 14:41

## 2023-01-01 RX ADMIN — METOPROLOL TARTRATE 5 MG: 5 INJECTION INTRAVENOUS at 21:23

## 2023-01-01 RX ADMIN — PIPERACILLIN SODIUM AND TAZOBACTAM SODIUM 2.25 G: 2; .25 INJECTION, SOLUTION INTRAVENOUS at 05:38

## 2023-01-01 RX ADMIN — FAMOTIDINE 20 MG: 10 INJECTION, SOLUTION INTRAVENOUS at 11:05

## 2023-01-01 RX ADMIN — LIDOCAINE 1 PATCH: 4 PATCH TOPICAL at 09:41

## 2023-01-01 RX ADMIN — DEXTROSE MONOHYDRATE 100 ML/HR: 50 INJECTION, SOLUTION INTRAVENOUS at 05:36

## 2023-01-01 RX ADMIN — METOPROLOL TARTRATE 5 MG: 5 INJECTION INTRAVENOUS at 03:26

## 2023-01-01 RX ADMIN — METOPROLOL TARTRATE 5 MG: 5 INJECTION INTRAVENOUS at 05:49

## 2023-01-01 RX ADMIN — SODIUM CHLORIDE 100 ML/HR: 9 INJECTION, SOLUTION INTRAVENOUS at 02:24

## 2023-01-01 RX ADMIN — DOCUSATE SODIUM 100 MG: 100 CAPSULE, LIQUID FILLED ORAL at 11:04

## 2023-01-01 RX ADMIN — DOCUSATE SODIUM LIQUID 100 MG: 100 LIQUID ORAL at 20:42

## 2023-01-01 RX ADMIN — METOPROLOL TARTRATE 5 MG: 5 INJECTION INTRAVENOUS at 04:46

## 2023-01-01 RX ADMIN — DOCUSATE SODIUM 100 MG: 100 CAPSULE, LIQUID FILLED ORAL at 20:45

## 2023-01-01 RX ADMIN — FAMOTIDINE 20 MG: 20 TABLET, FILM COATED ORAL at 08:08

## 2023-01-01 RX ADMIN — LIDOCAINE HYDROCHLORIDE 100 MG: 20 INJECTION INTRAVENOUS at 12:03

## 2023-01-01 RX ADMIN — METOPROLOL TARTRATE 5 MG: 5 INJECTION INTRAVENOUS at 18:04

## 2023-01-01 RX ADMIN — LIDOCAINE 1 PATCH: 4 PATCH TOPICAL at 16:15

## 2023-01-01 RX ADMIN — CASTOR OIL AND BALSAM, PERU: 788; 87 OINTMENT TOPICAL at 21:33

## 2023-01-01 RX ADMIN — CASTOR OIL AND BALSAM, PERU: 788; 87 OINTMENT TOPICAL at 16:47

## 2023-01-01 RX ADMIN — SODIUM CHLORIDE 1000 ML: 9 INJECTION, SOLUTION INTRAVENOUS at 11:00

## 2023-01-01 RX ADMIN — CLOPIDOGREL 75 MG: 75 TABLET ORAL at 14:14

## 2023-01-01 RX ADMIN — FAMOTIDINE 20 MG: 10 INJECTION, SOLUTION INTRAVENOUS at 15:43

## 2023-01-01 RX ADMIN — TRAMADOL HYDROCHLORIDE 50 MG: 50 TABLET, COATED ORAL at 20:56

## 2023-01-01 RX ADMIN — CASTOR OIL AND BALSAM, PERU: 788; 87 OINTMENT TOPICAL at 11:05

## 2023-01-01 RX ADMIN — PIPERACILLIN SODIUM AND TAZOBACTAM SODIUM 2.25 G: 2; .25 INJECTION, SOLUTION INTRAVENOUS at 22:56

## 2023-01-01 RX ADMIN — DEXTROSE MONOHYDRATE 100 ML/HR: 50 INJECTION, SOLUTION INTRAVENOUS at 09:32

## 2023-01-01 RX ADMIN — METOPROLOL TARTRATE 5 MG: 5 INJECTION INTRAVENOUS at 12:31

## 2023-01-01 RX ADMIN — LIDOCAINE 1 PATCH: 4 PATCH TOPICAL at 11:09

## 2023-01-01 RX ADMIN — PROPOFOL 100 MCG/KG/MIN: 10 INJECTION, EMULSION INTRAVENOUS at 12:03

## 2023-01-01 RX ADMIN — CASTOR OIL AND BALSAM, PERU: 788; 87 OINTMENT TOPICAL at 20:41

## 2023-01-01 RX ADMIN — MORPHINE SULFATE 2 MG: 2 INJECTION, SOLUTION INTRAMUSCULAR; INTRAVENOUS at 02:12

## 2023-01-01 RX ADMIN — PIPERACILLIN SODIUM AND TAZOBACTAM SODIUM 2.25 G: 2; .25 INJECTION, SOLUTION INTRAVENOUS at 16:46

## 2023-01-01 RX ADMIN — PIPERACILLIN SODIUM AND TAZOBACTAM SODIUM 2.25 G: 2; .25 INJECTION, SOLUTION INTRAVENOUS at 22:00

## 2023-01-01 RX ADMIN — PIPERACILLIN SODIUM AND TAZOBACTAM SODIUM 2.25 G: 2; .25 INJECTION, SOLUTION INTRAVENOUS at 14:15

## 2023-01-01 RX ADMIN — DEXTROSE MONOHYDRATE 100 ML/HR: 50 INJECTION, SOLUTION INTRAVENOUS at 22:56

## 2023-01-01 RX ADMIN — DEXTROSE MONOHYDRATE 100 ML/HR: 50 INJECTION, SOLUTION INTRAVENOUS at 00:25

## 2023-01-01 RX ADMIN — ACETAMINOPHEN 650 MG: 325 TABLET ORAL at 11:03

## 2023-01-01 RX ADMIN — METOPROLOL TARTRATE 5 MG: 5 INJECTION INTRAVENOUS at 15:44

## 2023-01-01 RX ADMIN — DOCUSATE SODIUM LIQUID 100 MG: 100 LIQUID ORAL at 08:08

## 2023-01-01 RX ADMIN — CASTOR OIL AND BALSAM, PERU: 788; 87 OINTMENT TOPICAL at 20:45

## 2023-01-01 RX ADMIN — METOPROLOL TARTRATE 5 MG: 5 INJECTION INTRAVENOUS at 11:49

## 2023-01-01 RX ADMIN — POTASSIUM CHLORIDE 20 MEQ: 14.9 INJECTION, SOLUTION INTRAVENOUS at 11:36

## 2023-01-01 RX ADMIN — CASTOR OIL AND BALSAM, PERU: 788; 87 OINTMENT TOPICAL at 09:32

## 2023-01-01 RX ADMIN — PIPERACILLIN SODIUM AND TAZOBACTAM SODIUM 2.25 G: 2; .25 INJECTION, SOLUTION INTRAVENOUS at 21:26

## 2023-01-01 RX ADMIN — POTASSIUM CHLORIDE 40 MEQ: 1.5 POWDER, FOR SOLUTION ORAL at 11:10

## 2023-01-01 RX ADMIN — METOPROLOL TARTRATE 5 MG: 5 INJECTION INTRAVENOUS at 05:37

## 2023-01-01 RX ADMIN — PIPERACILLIN SODIUM AND TAZOBACTAM SODIUM 2.25 G: 2; .25 INJECTION, SOLUTION INTRAVENOUS at 15:13

## 2023-01-01 RX ADMIN — PIPERACILLIN SODIUM AND TAZOBACTAM SODIUM 2.25 G: 2; .25 INJECTION, SOLUTION INTRAVENOUS at 05:40

## 2023-01-01 RX ADMIN — METOPROLOL TARTRATE 5 MG: 5 INJECTION INTRAVENOUS at 02:16

## 2023-01-01 RX ADMIN — CASTOR OIL AND BALSAM, PERU: 788; 87 OINTMENT TOPICAL at 08:07

## 2023-01-01 RX ADMIN — POTASSIUM CHLORIDE 40 MEQ: 1.5 POWDER, FOR SOLUTION ORAL at 14:02

## 2023-01-01 RX ADMIN — PIPERACILLIN SODIUM AND TAZOBACTAM SODIUM 2.25 G: 2; .25 INJECTION, SOLUTION INTRAVENOUS at 09:41

## 2023-01-01 RX ADMIN — PIPERACILLIN SODIUM AND TAZOBACTAM SODIUM 2.25 G: 2; .25 INJECTION, SOLUTION INTRAVENOUS at 02:16

## 2023-01-01 RX ADMIN — FAMOTIDINE 20 MG: 10 INJECTION, SOLUTION INTRAVENOUS at 09:18

## 2023-01-01 RX ADMIN — DEXTROSE MONOHYDRATE 100 ML/HR: 50 INJECTION, SOLUTION INTRAVENOUS at 18:47

## 2023-01-01 RX ADMIN — FAMOTIDINE 20 MG: 10 INJECTION, SOLUTION INTRAVENOUS at 09:32

## 2023-01-01 RX ADMIN — FAMOTIDINE 20 MG: 10 INJECTION, SOLUTION INTRAVENOUS at 09:42

## 2023-01-01 RX ADMIN — POTASSIUM CHLORIDE 20 MEQ: 1.5 POWDER, FOR SOLUTION ORAL at 15:30

## 2023-01-01 RX ADMIN — DEXTROSE MONOHYDRATE 100 ML/HR: 50 INJECTION, SOLUTION INTRAVENOUS at 09:08

## 2023-01-01 RX ADMIN — ACETAMINOPHEN 650 MG: 325 TABLET ORAL at 20:53

## 2023-01-01 RX ADMIN — METOPROLOL TARTRATE 5 MG: 5 INJECTION INTRAVENOUS at 18:59

## 2023-01-01 RX ADMIN — PIPERACILLIN SODIUM AND TAZOBACTAM SODIUM 2.25 G: 2; .25 INJECTION, SOLUTION INTRAVENOUS at 08:17

## 2023-01-01 RX ADMIN — METOPROLOL TARTRATE 5 MG: 5 INJECTION INTRAVENOUS at 09:18

## 2023-01-01 RX ADMIN — PIPERACILLIN SODIUM AND TAZOBACTAM SODIUM 2.25 G: 2; .25 INJECTION, SOLUTION INTRAVENOUS at 03:07

## 2023-01-01 RX ADMIN — METOPROLOL TARTRATE 5 MG: 5 INJECTION INTRAVENOUS at 18:47

## 2023-01-01 RX ADMIN — PIPERACILLIN SODIUM AND TAZOBACTAM SODIUM 2.25 G: 2; .25 INJECTION, SOLUTION INTRAVENOUS at 03:22

## 2023-01-01 RX ADMIN — METOPROLOL TARTRATE 5 MG: 5 INJECTION INTRAVENOUS at 13:54

## 2023-01-01 RX ADMIN — METOPROLOL TARTRATE 5 MG: 5 INJECTION INTRAVENOUS at 21:33

## 2023-01-01 RX ADMIN — SODIUM CHLORIDE 100 ML/HR: 9 INJECTION, SOLUTION INTRAVENOUS at 16:11

## 2023-01-01 RX ADMIN — METOPROLOL TARTRATE 5 MG: 5 INJECTION INTRAVENOUS at 00:01

## 2023-01-01 RX ADMIN — PIPERACILLIN SODIUM AND TAZOBACTAM SODIUM 2.25 G: 2; .25 INJECTION, SOLUTION INTRAVENOUS at 09:18

## 2023-01-01 RX ADMIN — LIDOCAINE 1 PATCH: 4 PATCH TOPICAL at 09:32

## 2023-01-01 RX ADMIN — CASTOR OIL AND BALSAM, PERU: 788; 87 OINTMENT TOPICAL at 09:41

## 2023-01-01 RX ADMIN — PIPERACILLIN SODIUM AND TAZOBACTAM SODIUM 2.25 G: 2; .25 INJECTION, SOLUTION INTRAVENOUS at 16:15

## 2023-01-01 RX ADMIN — METOPROLOL TARTRATE 5 MG: 5 INJECTION INTRAVENOUS at 20:42

## 2023-01-01 RX ADMIN — CASTOR OIL AND BALSAM, PERU: 788; 87 OINTMENT TOPICAL at 20:00

## 2023-01-01 RX ADMIN — FAMOTIDINE 20 MG: 10 INJECTION, SOLUTION INTRAVENOUS at 09:08

## 2023-01-01 RX ADMIN — METOPROLOL TARTRATE 5 MG: 5 INJECTION INTRAVENOUS at 00:36

## 2023-01-01 RX ADMIN — PIPERACILLIN SODIUM AND TAZOBACTAM SODIUM 2.25 G: 2; .25 INJECTION, SOLUTION INTRAVENOUS at 14:24

## 2023-01-01 RX ADMIN — METOPROLOL TARTRATE 5 MG: 5 INJECTION INTRAVENOUS at 06:13

## 2023-01-01 RX ADMIN — CLOPIDOGREL 75 MG: 75 TABLET ORAL at 08:08

## 2023-01-01 RX ADMIN — PIPERACILLIN SODIUM AND TAZOBACTAM SODIUM 2.25 G: 2; .25 INJECTION, SOLUTION INTRAVENOUS at 06:13

## 2023-01-01 RX ADMIN — PIPERACILLIN SODIUM AND TAZOBACTAM SODIUM 2.25 G: 2; .25 INJECTION, SOLUTION INTRAVENOUS at 22:06

## 2023-01-01 RX ADMIN — DEXTROSE MONOHYDRATE 100 ML/HR: 50 INJECTION, SOLUTION INTRAVENOUS at 05:39

## 2023-01-01 RX ADMIN — PIPERACILLIN SODIUM AND TAZOBACTAM SODIUM 2.25 G: 2; .25 INJECTION, SOLUTION INTRAVENOUS at 20:43

## 2023-01-01 RX ADMIN — METOPROLOL TARTRATE 5 MG: 5 INJECTION INTRAVENOUS at 08:08

## 2023-01-01 RX ADMIN — DEXTROSE MONOHYDRATE 100 ML/HR: 50 INJECTION, SOLUTION INTRAVENOUS at 18:05

## 2023-01-01 RX ADMIN — CASTOR OIL AND BALSAM, PERU: 788; 87 OINTMENT TOPICAL at 21:01

## 2023-01-01 RX ADMIN — DEXTROSE MONOHYDRATE 100 ML/HR: 50 INJECTION, SOLUTION INTRAVENOUS at 11:03

## 2023-01-01 RX ADMIN — METOPROLOL TARTRATE 5 MG: 5 INJECTION INTRAVENOUS at 14:11

## 2023-01-01 RX ADMIN — DEXTROSE MONOHYDRATE 100 ML/HR: 50 INJECTION, SOLUTION INTRAVENOUS at 20:44

## 2023-01-01 RX ADMIN — POTASSIUM CHLORIDE 20 MEQ: 1.5 POWDER, FOR SOLUTION ORAL at 15:31

## 2023-01-01 RX ADMIN — PIPERACILLIN SODIUM AND TAZOBACTAM SODIUM 2.25 G: 2; .25 INJECTION, SOLUTION INTRAVENOUS at 21:00

## 2023-01-01 SDOH — SOCIAL STABILITY: SOCIAL INSECURITY: WERE YOU ABLE TO COMPLETE ALL THE BEHAVIORAL HEALTH SCREENINGS?: NO

## 2023-01-01 SDOH — SOCIAL STABILITY: SOCIAL INSECURITY: DO YOU FEEL ANYONE HAS EXPLOITED OR TAKEN ADVANTAGE OF YOU FINANCIALLY OR OF YOUR PERSONAL PROPERTY?: UNABLE TO ASSESS

## 2023-01-01 SDOH — SOCIAL STABILITY: SOCIAL INSECURITY: ARE THERE ANY APPARENT SIGNS OF INJURIES/BEHAVIORS THAT COULD BE RELATED TO ABUSE/NEGLECT?: UNABLE TO ASSESS

## 2023-01-01 SDOH — SOCIAL STABILITY: SOCIAL INSECURITY: DO YOU FEEL UNSAFE GOING BACK TO THE PLACE WHERE YOU ARE LIVING?: UNABLE TO ASSESS

## 2023-01-01 SDOH — SOCIAL STABILITY: SOCIAL INSECURITY: ARE YOU OR HAVE YOU BEEN THREATENED OR ABUSED PHYSICALLY, EMOTIONALLY, OR SEXUALLY BY ANYONE?: UNABLE TO ASSESS

## 2023-01-01 SDOH — SOCIAL STABILITY: SOCIAL INSECURITY: HAVE YOU HAD THOUGHTS OF HARMING ANYONE ELSE?: UNABLE TO ASSESS

## 2023-01-01 SDOH — SOCIAL STABILITY: SOCIAL INSECURITY: DOES ANYONE TRY TO KEEP YOU FROM HAVING/CONTACTING OTHER FRIENDS OR DOING THINGS OUTSIDE YOUR HOME?: UNABLE TO ASSESS

## 2023-01-01 SDOH — SOCIAL STABILITY: SOCIAL INSECURITY: ABUSE: ADULT

## 2023-01-01 SDOH — SOCIAL STABILITY: SOCIAL INSECURITY: HAS ANYONE EVER THREATENED TO HURT YOUR FAMILY OR YOUR PETS?: UNABLE TO ASSESS

## 2023-01-01 ASSESSMENT — PAIN SCALES - PAIN ASSESSMENT IN ADVANCED DEMENTIA (PAINAD)
TOTALSCORE: 3
CONSOLABILITY: NO NEED TO CONSOLE
CONSOLABILITY: NO NEED TO CONSOLE
BREATHING: NORMAL
FACIALEXPRESSION: SMILING OR INEXPRESSIVE
BREATHING: NORMAL
BODYLANGUAGE: TENSE, DISTRESSED PACING, FIDGETING
CONSOLABILITY: DISTRACTED OR REASSURED BY VOICE/TOUCH
NEGVOCALIZATION: OCCASIONAL MOAN/GROAN, LOW SPEECH, NEGATIVE/DISAPPROVING QUALITY
BODYLANGUAGE: RELAXED
BREATHING: NORMAL
NEGVOCALIZATION: OCCASIONAL MOAN/GROAN, LOW SPEECH, NEGATIVE/DISAPPROVING QUALITY
NEGVOCALIZATION: OCCASIONAL MOAN/GROAN, LOW SPEECH, NEGATIVE/DISAPPROVING QUALITY
TOTALSCORE: 0
BREATHING: NORMAL
BODYLANGUAGE: TENSE, DISTRESSED PACING, FIDGETING
CONSOLABILITY: NO NEED TO CONSOLE
CONSOLABILITY: NO NEED TO CONSOLE
CONSOLABILITY: UNABLE TO CONSOLE, DISTRACT OR REASSURE
TOTALSCORE: 0
BREATHING: NORMAL
TOTALSCORE: 2
BODYLANGUAGE: RELAXED
CONSOLABILITY: DISTRACTED OR REASSURED BY VOICE/TOUCH
CONSOLABILITY: NO NEED TO CONSOLE
FACIALEXPRESSION: SMILING OR INEXPRESSIVE
BREATHING: NOISY LABORED BREATHING, LONG PERIODS OF HYPERVENTILATION, CHEYNE-STOKES RESPIRATIONS
FACIALEXPRESSION: SMILING OR INEXPRESSIVE
NEGVOCALIZATION: OCCASIONAL MOAN/GROAN, LOW SPEECH, NEGATIVE/DISAPPROVING QUALITY
BREATHING: NORMAL
BODYLANGUAGE: TENSE, DISTRESSED PACING, FIDGETING
BODYLANGUAGE: RELAXED
BREATHING: NORMAL
FACIALEXPRESSION: FACIAL GRIMACING
BODYLANGUAGE: RELAXED
FACIALEXPRESSION: SMILING OR INEXPRESSIVE
TOTALSCORE: 5
FACIALEXPRESSION: FACIAL GRIMACING
NEGVOCALIZATION: OCCASIONAL MOAN/GROAN, LOW SPEECH, NEGATIVE/DISAPPROVING QUALITY
FACIALEXPRESSION: SMILING OR INEXPRESSIVE
TOTALSCORE: 6
TOTALSCORE: 3
BODYLANGUAGE: TENSE, DISTRESSED PACING, FIDGETING

## 2023-01-01 ASSESSMENT — PAIN - FUNCTIONAL ASSESSMENT
PAIN_FUNCTIONAL_ASSESSMENT: PAINAD (PAIN ASSESSMENT IN ADVANCED DEMENTIA SCALE)
PAIN_FUNCTIONAL_ASSESSMENT: PAINAD (PAIN ASSESSMENT IN ADVANCED DEMENTIA SCALE)
PAIN_FUNCTIONAL_ASSESSMENT: 0-10
PAIN_FUNCTIONAL_ASSESSMENT: 0-10
PAIN_FUNCTIONAL_ASSESSMENT: FLACC (FACE, LEGS, ACTIVITY, CRY, CONSOLABILITY)
PAIN_FUNCTIONAL_ASSESSMENT: 0-10
PAIN_FUNCTIONAL_ASSESSMENT: PAINAD (PAIN ASSESSMENT IN ADVANCED DEMENTIA SCALE)
PAIN_FUNCTIONAL_ASSESSMENT: 0-10

## 2023-01-01 ASSESSMENT — LIFESTYLE VARIABLES
SUBSTANCE_ABUSE_PAST_12_MONTHS: NO
HAVE PEOPLE ANNOYED YOU BY CRITICIZING YOUR DRINKING: NO
HOW OFTEN DO YOU HAVE A DRINK CONTAINING ALCOHOL: NEVER
PRESCIPTION_ABUSE_PAST_12_MONTHS: NO
SKIP TO QUESTIONS 9-10: 1
EVER FELT BAD OR GUILTY ABOUT YOUR DRINKING: NO
HOW MANY STANDARD DRINKS CONTAINING ALCOHOL DO YOU HAVE ON A TYPICAL DAY: PATIENT DOES NOT DRINK
HAVE YOU EVER FELT YOU SHOULD CUT DOWN ON YOUR DRINKING: NO
REASON UNABLE TO ASSESS: YES
HOW OFTEN DO YOU HAVE 6 OR MORE DRINKS ON ONE OCCASION: NEVER
AUDIT-C TOTAL SCORE: 0
EVER HAD A DRINK FIRST THING IN THE MORNING TO STEADY YOUR NERVES TO GET RID OF A HANGOVER: NO
AUDIT-C TOTAL SCORE: 0

## 2023-01-01 ASSESSMENT — COLUMBIA-SUICIDE SEVERITY RATING SCALE - C-SSRS
1. IN THE PAST MONTH, HAVE YOU WISHED YOU WERE DEAD OR WISHED YOU COULD GO TO SLEEP AND NOT WAKE UP?: NO
2. HAVE YOU ACTUALLY HAD ANY THOUGHTS OF KILLING YOURSELF?: NO
6. HAVE YOU EVER DONE ANYTHING, STARTED TO DO ANYTHING, OR PREPARED TO DO ANYTHING TO END YOUR LIFE?: NO

## 2023-01-01 ASSESSMENT — PAIN SCALES - GENERAL
PAINLEVEL_OUTOF10: 0 - NO PAIN
PAINLEVEL_OUTOF10: 2
PAINLEVEL_OUTOF10: 0 - NO PAIN
PAINLEVEL_OUTOF10: 5 - MODERATE PAIN
PAINLEVEL_OUTOF10: 0 - NO PAIN
PAINLEVEL_OUTOF10: 0 - NO PAIN

## 2023-01-01 ASSESSMENT — COGNITIVE AND FUNCTIONAL STATUS - GENERAL
EATING MEALS: TOTAL
DRESSING REGULAR UPPER BODY CLOTHING: TOTAL
HELP NEEDED FOR BATHING: TOTAL
MOBILITY SCORE: 6
WALKING IN HOSPITAL ROOM: TOTAL
MOVING FROM LYING ON BACK TO SITTING ON SIDE OF FLAT BED WITH BEDRAILS: TOTAL
DRESSING REGULAR UPPER BODY CLOTHING: TOTAL
PERSONAL GROOMING: TOTAL
TURNING FROM BACK TO SIDE WHILE IN FLAT BAD: TOTAL
DRESSING REGULAR LOWER BODY CLOTHING: TOTAL
TURNING FROM BACK TO SIDE WHILE IN FLAT BAD: TOTAL
DRESSING REGULAR LOWER BODY CLOTHING: TOTAL
MOVING TO AND FROM BED TO CHAIR: TOTAL
STANDING UP FROM CHAIR USING ARMS: TOTAL
WALKING IN HOSPITAL ROOM: TOTAL
CLIMB 3 TO 5 STEPS WITH RAILING: TOTAL
TURNING FROM BACK TO SIDE WHILE IN FLAT BAD: TOTAL
MOVING TO AND FROM BED TO CHAIR: TOTAL
PERSONAL GROOMING: TOTAL
HELP NEEDED FOR BATHING: TOTAL
DAILY ACTIVITIY SCORE: 6
DRESSING REGULAR UPPER BODY CLOTHING: TOTAL
TOILETING: TOTAL
MOVING TO AND FROM BED TO CHAIR: TOTAL
TURNING FROM BACK TO SIDE WHILE IN FLAT BAD: TOTAL
TOILETING: TOTAL
CLIMB 3 TO 5 STEPS WITH RAILING: TOTAL
STANDING UP FROM CHAIR USING ARMS: TOTAL
DRESSING REGULAR UPPER BODY CLOTHING: TOTAL
MOVING TO AND FROM BED TO CHAIR: TOTAL
PERSONAL GROOMING: TOTAL
EATING MEALS: A LOT
DAILY ACTIVITIY SCORE: 7
EATING MEALS: TOTAL
DAILY ACTIVITIY SCORE: 6
STANDING UP FROM CHAIR USING ARMS: TOTAL
MOVING FROM LYING ON BACK TO SITTING ON SIDE OF FLAT BED WITH BEDRAILS: TOTAL
DRESSING REGULAR LOWER BODY CLOTHING: TOTAL
DRESSING REGULAR LOWER BODY CLOTHING: TOTAL
MOVING FROM LYING ON BACK TO SITTING ON SIDE OF FLAT BED WITH BEDRAILS: TOTAL
WALKING IN HOSPITAL ROOM: TOTAL
HELP NEEDED FOR BATHING: TOTAL
WALKING IN HOSPITAL ROOM: TOTAL
MOBILITY SCORE: 6
DAILY ACTIVITIY SCORE: 6
TOILETING: TOTAL
MOBILITY SCORE: 6
STANDING UP FROM CHAIR USING ARMS: TOTAL
PERSONAL GROOMING: TOTAL
HELP NEEDED FOR BATHING: TOTAL
MOBILITY SCORE: 6
TOILETING: TOTAL
MOVING FROM LYING ON BACK TO SITTING ON SIDE OF FLAT BED WITH BEDRAILS: TOTAL
PATIENT BASELINE BEDBOUND: YES
EATING MEALS: TOTAL

## 2023-01-01 ASSESSMENT — ACTIVITIES OF DAILY LIVING (ADL)
GROOMING: DEPENDENT
TOILETING: DEPENDENT
FEEDING YOURSELF: DEPENDENT
JUDGMENT_ADEQUATE_SAFELY_COMPLETE_DAILY_ACTIVITIES: NO
WALKS IN HOME: DEPENDENT
BATHING: DEPENDENT
ADEQUATE_TO_COMPLETE_ADL: UNABLE TO ASSESS
DRESSING YOURSELF: DEPENDENT
HEARING - RIGHT EAR: FUNCTIONAL
HEARING - LEFT EAR: FUNCTIONAL
ADEQUATE_TO_COMPLETE_ADL: UNABLE TO ASSESS
PATIENT'S MEMORY ADEQUATE TO SAFELY COMPLETE DAILY ACTIVITIES?: NO

## 2023-01-01 ASSESSMENT — PATIENT HEALTH QUESTIONNAIRE - PHQ9
2. FEELING DOWN, DEPRESSED OR HOPELESS: NOT AT ALL
SUM OF ALL RESPONSES TO PHQ9 QUESTIONS 1 & 2: 0
1. LITTLE INTEREST OR PLEASURE IN DOING THINGS: NOT AT ALL

## 2023-03-06 NOTE — LETTER
Subjective  Chief complaint: Servando Mercado is a 82 y.o. male who is a long term resident  Who presents for neck pain, headache.  HPI:  Patient presents for follow-up neck pain, headache.  Call received from facility stating the patient continued to complain of pain to neck and have headaches.  Patient examined today at bedside.  Imaging to neck was negative for any acute findings.  Patient also complains of abdominal pain.  X-ray were negative for any acute findings.  Order was given to obtain CT scan of abdomen and pelvis.  Patient reports no other concerns.  No acute distress.        Review of Systems  All systems reviewed and negative except for what was mentioned in the HPI    Vital signs: 132/72, 98.7, 70, 18,    Objective  Physical Exam    Assessment/Plan  Problem List Items Addressed This Visit       Abdominal pain     Obtain CT of abdomen, pelvis.  Obtain ESR, BMP, CRP         Headache     Continue pain meds as needed.         Neck pain     Continue pain medicine as needed.  Imaging negative for any acute findings.          Medications, treatments, and labs reviewed  Continue medications and treatments as listed in [facility]    Scribe Attestation  By signing my name below, I, Mariana herr   attest that this documentation has been prepared under the direction and in the presence of Rita Valencia MD.    Provider Attestation - Scribe documentation  All medical record entries made by the Scribe were at my direction and personally dictated by me. I have reviewed the chart and agree that the record accurately reflects my personal performance of the history, physical exam, discussion and plan.

## 2023-03-07 PROBLEM — R10.9 ABDOMINAL PAIN: Status: ACTIVE | Noted: 2023-01-01

## 2023-03-07 PROBLEM — R51.9 HEADACHE: Status: ACTIVE | Noted: 2023-01-01

## 2023-03-07 PROBLEM — M54.2 NECK PAIN: Status: ACTIVE | Noted: 2023-01-01

## 2023-03-07 NOTE — PROGRESS NOTES
Subjective   Chief complaint: Servando Mercado is a 82 y.o. male who is a long term resident  Who presents for neck pain, headache.  HPI:  Patient presents for follow-up neck pain, headache.  Call received from facility stating the patient continued to complain of pain to neck and have headaches.  Patient examined today at bedside.  Imaging to neck was negative for any acute findings.  Patient also complains of abdominal pain.  X-ray were negative for any acute findings.  Order was given to obtain CT scan of abdomen and pelvis.  Patient reports no other concerns.  No acute distress.        Review of Systems  All systems reviewed and negative except for what was mentioned in the HPI    Vital signs: 132/72, 98.7, 70, 18,    Objective   Physical Exam    Assessment/Plan   Problem List Items Addressed This Visit       Abdominal pain     Obtain CT of abdomen, pelvis.  Obtain ESR, BMP, CRP         Headache     Continue pain meds as needed.         Neck pain     Continue pain medicine as needed.  Imaging negative for any acute findings.          Medications, treatments, and labs reviewed  Continue medications and treatments as listed in [facility]    Scribe Attestation  By signing my name below, I, Mariana herr   attest that this documentation has been prepared under the direction and in the presence of Rita Valencia MD.    Provider Attestation - Scribe documentation  All medical record entries made by the Scribe were at my direction and personally dictated by me. I have reviewed the chart and agree that the record accurately reflects my personal performance of the history, physical exam, discussion and plan.

## 2023-03-09 NOTE — LETTER
Subjective  Chief complaint: Servando Mercado is a 82 y.o. male who is a long term resident patient being seen and evaluated for multiple medical problems.  Patient presents for general medical care and follow-up    HPI:      Patient presents for general medical care and f/u.  Patient seen and examined at bedside.  No issues per nursing.  Patient has no acute complaints.  Denies chest pain and headache.  Patient with Hx of gout.  No recent flairs.  Patient with PVD, denies changes in the skin or decreased temperature. No acute distress.         Review of Systems  All systems reviewed and negative except for what was mentioned in the HPI    Vital signs: 4/78, 98.7, 70, 18, 94%    Objective  Physical Exam  Constitutional:       General: He is not in acute distress.  Eyes:      Extraocular Movements: Extraocular movements intact.   Cardiovascular:      Rate and Rhythm: Regular rhythm.   Pulmonary:      Effort: Pulmonary effort is normal.      Breath sounds: Normal breath sounds.   Abdominal:      General: Bowel sounds are normal.      Palpations: Abdomen is soft.   Musculoskeletal:      Cervical back: Neck supple.      Right lower leg: No edema.      Left lower leg: No edema.   Neurological:      Mental Status: He is alert.   Psychiatric:         Mood and Affect: Mood normal.         Behavior: Behavior is cooperative.         Assessment/Plan  Problem List Items Addressed This Visit          Circulatory    Hypertension     BP at goal  Continue antihyertensives         PVD (peripheral vascular disease) (CMS/[facility])     Wounds to RLE and LLE managed by facility wound care team  Dressings clean and intact            Other    Gout     Stable  No recent flairs          Medications, treatments, and labs reviewed  Continue medications and treatments as listed in [facility]    Scribe Attestation  By signing my name below, IAmirah, Coreyibmarlon   attest that this documentation has been prepared under the direction and in the presence of  Rita Valencia MD.    Provider Attestation - Scribe documentation  All medical record entries made by the Scribe were at my direction and personally dictated by me. I have reviewed the chart and agree that the record accurately reflects my personal performance of the history, physical exam, discussion and plan.

## 2023-03-14 PROBLEM — M10.9 GOUT: Status: ACTIVE | Noted: 2023-01-01

## 2023-03-14 PROBLEM — I10 HYPERTENSION: Status: ACTIVE | Noted: 2023-01-01

## 2023-03-14 PROBLEM — I73.9 PVD (PERIPHERAL VASCULAR DISEASE) (CMS-HCC): Status: ACTIVE | Noted: 2023-01-01

## 2023-03-14 NOTE — PROGRESS NOTES
Subjective   Chief complaint: Servando Mercado is a 82 y.o. male who is a long term resident patient being seen and evaluated for multiple medical problems.  Patient presents for general medical care and follow-up    HPI:      Patient presents for general medical care and f/u.  Patient seen and examined at bedside.  No issues per nursing.  Patient has no acute complaints.  Denies chest pain and headache.  Patient with Hx of gout.  No recent flairs.  Patient with PVD, denies changes in the skin or decreased temperature. No acute distress.         Review of Systems  All systems reviewed and negative except for what was mentioned in the HPI    Vital signs: 4/78, 98.7, 70, 18, 94%    Objective   Physical Exam  Constitutional:       General: He is not in acute distress.  Eyes:      Extraocular Movements: Extraocular movements intact.   Cardiovascular:      Rate and Rhythm: Regular rhythm.   Pulmonary:      Effort: Pulmonary effort is normal.      Breath sounds: Normal breath sounds.   Abdominal:      General: Bowel sounds are normal.      Palpations: Abdomen is soft.   Musculoskeletal:      Cervical back: Neck supple.      Right lower leg: No edema.      Left lower leg: No edema.   Neurological:      Mental Status: He is alert.   Psychiatric:         Mood and Affect: Mood normal.         Behavior: Behavior is cooperative.         Assessment/Plan   Problem List Items Addressed This Visit          Circulatory    Hypertension     BP at goal  Continue antihyertensives         PVD (peripheral vascular disease) (CMS/[facility])     Wounds to RLE and LLE managed by facility wound care team  Dressings clean and intact            Other    Gout     Stable  No recent flairs          Medications, treatments, and labs reviewed  Continue medications and treatments as listed in [facility]    Scribe Attestation  By signing my name below, IAmirah, Coreyibe   attest that this documentation has been prepared under the direction and in the presence  of Rita Valencia MD.    Provider Attestation - Scribe documentation  All medical record entries made by the Scribe were at my direction and personally dictated by me. I have reviewed the chart and agree that the record accurately reflects my personal performance of the history, physical exam, discussion and plan.

## 2023-03-17 NOTE — LETTER
Patient: Servando Mercado  : 1940    Encounter Date: 2023    PROGRESS NOTE    Subjective  Chief complaint: Servando Mercado is a 82 y.o. male who is a long term care patient being seen and evaluated for wt loss     HPI:  HPI  Objective  Vital signs:   18, 134/78, 98.7, 70, 94%  Physical Exam  Constitutional:       General: He is not in acute distress.  HENT:      Mouth/Throat:      Pharynx: No oropharyngeal exudate.   Pulmonary:      Effort: No respiratory distress.   Abdominal:      General: There is no distension.   Neurological:      Motor: No weakness.         Assessment/Plan  Problem List Items Addressed This Visit    None    Medications, treatments, and labs reviewed  Continue medications and treatments as listed in [facility]    Scribe Attestation  IBre Scribe   attest that this documentation has been prepared under the direction and in the presence of JOANNA Louis    Provider Attestation - Scribe documentation  All medical record entries made by the Scribe were at my direction and personally dictated by me. I have reviewed the chart and agree that the record accurately reflects my personal performance of the history, physical exam, discussion and plan.   JOANNA Louis        Electronically Signed By: JOANNA Louis   4/10/23  9:13 AM

## 2023-03-30 NOTE — PROGRESS NOTES
PROGRESS NOTE    Subjective   Chief complaint: Servando Mercado is a 82 y.o. male who is a long term care patient being seen and evaluated for wt loss     HPI:  HPI  Objective   Vital signs:   18, 134/78, 98.7, 70, 94%  Physical Exam  Constitutional:       General: He is not in acute distress.  HENT:      Mouth/Throat:      Pharynx: No oropharyngeal exudate.   Pulmonary:      Effort: No respiratory distress.   Abdominal:      General: There is no distension.   Neurological:      Motor: No weakness.         Assessment/Plan   Problem List Items Addressed This Visit    None    Medications, treatments, and labs reviewed  Continue medications and treatments as listed in [facility]    Scribe Attestation  Bre NUNEZ Scribe   attest that this documentation has been prepared under the direction and in the presence of JOANNA oLuis    Provider Attestation - Scribe documentation  All medical record entries made by the Scribe were at my direction and personally dictated by me. I have reviewed the chart and agree that the record accurately reflects my personal performance of the history, physical exam, discussion and plan.   JOANNA Louis

## 2023-04-10 PROBLEM — R63.4 ABNORMAL WEIGHT LOSS: Status: ACTIVE | Noted: 2023-01-01

## 2023-04-10 NOTE — LETTER
Patient: Servando Mercado  : 1940    Encounter Date: 04/10/2023    Subjective  Chief complaint: Servando Mercado is a 82 y.o. male who is a long term resident patient being seen and evaluated for multiple medical problems.  Patient presents for general medical care and follow-up    HPI:  Patient presents for general medical care and f/u.  Patient seen and examined at bedside.  No issues per nursing.  Patient has no acute complaints.  Denies chest pain and headache.  Patient with Hx of gout.  No recent flairs.  Patient with PVD, denies changes in the skin or decreased temperature. No acute distress.         Review of Systems  All systems reviewed and negative except for what was mentioned in the HPI    Vital signs:      124/78, 98.7, 70, 18, 94%    Objective  Physical Exam  Constitutional:       General: He is not in acute distress.  Eyes:      Extraocular Movements: Extraocular movements intact.   Cardiovascular:      Rate and Rhythm: Regular rhythm.   Pulmonary:      Effort: Pulmonary effort is normal.      Breath sounds: Normal breath sounds.   Abdominal:      General: Bowel sounds are normal.      Palpations: Abdomen is soft.   Musculoskeletal:      Cervical back: Neck supple.      Right lower leg: No edema.      Left lower leg: No edema.   Neurological:      Mental Status: He is alert.   Psychiatric:         Mood and Affect: Mood normal.         Behavior: Behavior is cooperative.         Assessment/Plan  Problem List Items Addressed This Visit          Circulatory    Hypertension     BP at goal  Continue antihyertensives         PVD (peripheral vascular disease) (CMS/HCC)     Wounds to RLE and LLE managed by facility wound care team  Dressings clean and intact            Other    Gout     Stable  No recent flairs        Medications, treatments, and labs reviewed  Continue medications and treatments as listed in [facility]    Scribe Attestation  By signing my name below, IAmirah, Scribe   attest that this  documentation has been prepared under the direction and in the presence of Rita Valencia MD.    Provider Attestation - Scribe documentation  All medical record entries made by the Scribe were at my direction and personally dictated by me. I have reviewed the chart and agree that the record accurately reflects my personal performance of the history, physical exam, discussion and plan.      Electronically Signed By: Rita Valencia MD   4/13/23  6:31 PM

## 2023-04-13 NOTE — PROGRESS NOTES
Subjective   Chief complaint: Servando Mercado is a 82 y.o. male who is a long term resident patient being seen and evaluated for multiple medical problems.  Patient presents for general medical care and follow-up    HPI:  Patient presents for general medical care and f/u.  Patient seen and examined at bedside.  No issues per nursing.  Patient has no acute complaints.  Denies chest pain and headache.  Patient with Hx of gout.  No recent flairs.  Patient with PVD, denies changes in the skin or decreased temperature. No acute distress.         Review of Systems  All systems reviewed and negative except for what was mentioned in the HPI    Vital signs:      124/78, 98.7, 70, 18, 94%    Objective   Physical Exam  Constitutional:       General: He is not in acute distress.  Eyes:      Extraocular Movements: Extraocular movements intact.   Cardiovascular:      Rate and Rhythm: Regular rhythm.   Pulmonary:      Effort: Pulmonary effort is normal.      Breath sounds: Normal breath sounds.   Abdominal:      General: Bowel sounds are normal.      Palpations: Abdomen is soft.   Musculoskeletal:      Cervical back: Neck supple.      Right lower leg: No edema.      Left lower leg: No edema.   Neurological:      Mental Status: He is alert.   Psychiatric:         Mood and Affect: Mood normal.         Behavior: Behavior is cooperative.         Assessment/Plan   Problem List Items Addressed This Visit          Circulatory    Hypertension     BP at goal  Continue antihyertensives         PVD (peripheral vascular disease) (CMS/[facility])     Wounds to RLE and LLE managed by facility wound care team  Dressings clean and intact            Other    Gout     Stable  No recent flairs        Medications, treatments, and labs reviewed  Continue medications and treatments as listed in [facility]    Scribe Attestation  By signing my name below, IAmirah, Coreyibmarlon   attest that this documentation has been prepared under the direction and in the  presence of Rita Valencia MD.    Provider Attestation - Scribe documentation  All medical record entries made by the Scribe were at my direction and personally dictated by me. I have reviewed the chart and agree that the record accurately reflects my personal performance of the history, physical exam, discussion and plan.

## 2023-05-08 NOTE — LETTER
Patient: Servando Mercado  : 1940    Encounter Date: 2023    Subjective  Chief complaint: Servando Mercado is a 83 y.o. male who is a long term resident patient being seen and evaluated for multiple medical problems.  Patient presents for general medical care and follow-up    HPI:  Patient presents for general medical care and f/u.  Patient seen and examined at bedside.  No issues per nursing.  Patient has no acute complaints.  Denies chest pain and headache.  Patient with Hx of gout.  No recent flairs.  Patient with PVD, denies changes in the skin or decreased temperature. No acute distress.         Review of Systems  All systems reviewed and negative except for what was mentioned in the HPI    Vital signs:      123/76, 95%    Objective  Physical Exam  Constitutional:       General: He is not in acute distress.  Eyes:      Extraocular Movements: Extraocular movements intact.   Cardiovascular:      Rate and Rhythm: Regular rhythm.   Pulmonary:      Effort: Pulmonary effort is normal.      Breath sounds: Normal breath sounds.   Abdominal:      General: Bowel sounds are normal.      Palpations: Abdomen is soft.   Musculoskeletal:      Cervical back: Neck supple.      Right lower leg: No edema.      Left lower leg: No edema.   Neurological:      Mental Status: He is alert.   Psychiatric:         Mood and Affect: Mood normal.         Behavior: Behavior is cooperative.         Assessment/Plan  Problem List Items Addressed This Visit          Circulatory    Hypertension     BP at goal  Continue antihypertensives  Monitor BP         PVD (peripheral vascular disease) (CMS/[facility])     Wounds to RLE and LLE managed by facility wound care team  Dressings clean and intact  Monitor for S&S infection            Other    Gout     Stable  No recent flairs  Monitor for symptoms        Medications, treatments, and labs reviewed  Continue medications and treatments as listed in [facility]    Scribe Attestation  By signing my name  below, I, Mariana Amaya   attest that this documentation has been prepared under the direction and in the presence of Rita Valencia MD.    Provider Attestation - Scribe documentation  All medical record entries made by the Scribe were at my direction and personally dictated by me. I have reviewed the chart and agree that the record accurately reflects my personal performance of the history, physical exam, discussion and plan.  1. Gout, unspecified cause, unspecified chronicity, unspecified site        2. Hypertension, unspecified type        3. PVD (peripheral vascular disease) (CMS/[facility])              Electronically Signed By: Rita Valencia MD   5/11/23  4:49 PM

## 2023-05-11 NOTE — ASSESSMENT & PLAN NOTE
Wounds to RLE and LLE managed by facility wound care team  Dressings clean and intact  Monitor for S&S infection

## 2023-05-11 NOTE — PROGRESS NOTES
Subjective   Chief complaint: Servando Mercado is a 83 y.o. male who is a long term resident patient being seen and evaluated for multiple medical problems.  Patient presents for general medical care and follow-up    HPI:  Patient presents for general medical care and f/u.  Patient seen and examined at bedside.  No issues per nursing.  Patient has no acute complaints.  Denies chest pain and headache.  Patient with Hx of gout.  No recent flairs.  Patient with PVD, denies changes in the skin or decreased temperature. No acute distress.         Review of Systems  All systems reviewed and negative except for what was mentioned in the HPI    Vital signs:      123/76, 95%    Objective   Physical Exam  Constitutional:       General: He is not in acute distress.  Eyes:      Extraocular Movements: Extraocular movements intact.   Cardiovascular:      Rate and Rhythm: Regular rhythm.   Pulmonary:      Effort: Pulmonary effort is normal.      Breath sounds: Normal breath sounds.   Abdominal:      General: Bowel sounds are normal.      Palpations: Abdomen is soft.   Musculoskeletal:      Cervical back: Neck supple.      Right lower leg: No edema.      Left lower leg: No edema.   Neurological:      Mental Status: He is alert.   Psychiatric:         Mood and Affect: Mood normal.         Behavior: Behavior is cooperative.         Assessment/Plan   Problem List Items Addressed This Visit          Circulatory    Hypertension     BP at goal  Continue antihypertensives  Monitor BP         PVD (peripheral vascular disease) (CMS/[facility])     Wounds to RLE and LLE managed by facility wound care team  Dressings clean and intact  Monitor for S&S infection            Other    Gout     Stable  No recent flairs  Monitor for symptoms        Medications, treatments, and labs reviewed  Continue medications and treatments as listed in [facility]    Scribe Attestation  By signing my name below, IAmirah, Scribe   attest that this documentation has been  prepared under the direction and in the presence of Rita Valencia MD.    Provider Attestation - Scribe documentation  All medical record entries made by the Scribe were at my direction and personally dictated by me. I have reviewed the chart and agree that the record accurately reflects my personal performance of the history, physical exam, discussion and plan.  1. Gout, unspecified cause, unspecified chronicity, unspecified site        2. Hypertension, unspecified type        3. PVD (peripheral vascular disease) (CMS/HCC)

## 2023-06-06 NOTE — LETTER
Patient: Servando Mercado  : 1940    Encounter Date: 2023    PROGRESS NOTE    Subjective  Chief complaint: Servando Mercado is a 83 y.o. male who is a long term care patient being seen and evaluated for neck pain/stiffness and difficulty swallowing.    HPI:   Patient being seen for complaint of neck pain/stiffness and difficulty swallowing.    Denies nausea, vomiting, fever and chills.      Objective  Vital signs:   18, 115/62, 97.8, 60, 98%  Physical Exam  Constitutional:       General: He is not in acute distress.  Eyes:      Extraocular Movements: Extraocular movements intact.   Pulmonary:      Effort: Pulmonary effort is normal.   Musculoskeletal:      Cervical back: Neck supple.   Neurological:      Mental Status: He is alert.   Psychiatric:         Mood and Affect: Mood normal.         Behavior: Behavior is cooperative.         Assessment/Plan  Problem List Items Addressed This Visit    None  Visit Diagnoses       Dysphagia, unspecified type    -  Primary    ENT consult speech therapy consult          Medications, treatments, and labs reviewed  Continue medications and treatments as listed in EMR    Scribe Attestation  IBre Scribe   attest that this documentation has been prepared under the direction and in the presence of JOANNA Louis    Provider Attestation - Scribe documentation  All medical record entries made by the Scribe were at my direction and personally dictated by me. I have reviewed the chart and agree that the record accurately reflects my personal performance of the history, physical exam, discussion and plan.   JOANNA Louis        Electronically Signed By: JOANNA Lousi   23 10:49 AM

## 2023-06-08 NOTE — LETTER
Patient: Servando Mercado  : 1940    Encounter Date: 2023    Subjective  Chief complaint: Servando Mercado is a 83 y.o. male who is a long term resident patient being seen and evaluated for multiple medical problems.  Patient presents for general medical care and follow-up    HPI:  Patient presents for general medical care and f/u.  Patient seen and examined at bedside.  No issues per nursing.  Patient has no acute complaints.  Denies chest pain and headache.  Patient with Hx of gout.  No recent flairs.  Patient with PVD, denies changes in the skin or decreased temperature. No acute distress.         Review of Systems  All systems reviewed and negative except for what was mentioned in the HPI    Vital signs:      124/77, 96%    Objective  Physical Exam  Constitutional:       General: He is not in acute distress.  Eyes:      Extraocular Movements: Extraocular movements intact.   Cardiovascular:      Rate and Rhythm: Regular rhythm.   Pulmonary:      Effort: Pulmonary effort is normal.      Breath sounds: Normal breath sounds.   Abdominal:      General: Bowel sounds are normal.      Palpations: Abdomen is soft.   Musculoskeletal:      Cervical back: Neck supple.      Right lower leg: No edema.      Left lower leg: No edema.   Neurological:      Mental Status: He is alert.   Psychiatric:         Mood and Affect: Mood normal.         Behavior: Behavior is cooperative.         Assessment/Plan  Problem List Items Addressed This Visit          Circulatory    Hypertension     BP at goal  Continue antihypertensives  Monitor BP         PVD (peripheral vascular disease) (CMS/[facility])     Wounds to RLE and LLE managed by facility wound care team  Dressings clean and intact  Monitor for S&S infection            Other    Gout     Stable  No recent flairs  Monitor for symptoms        Medications, treatments, and labs reviewed  Continue medications and treatments as listed in [facility]    Scribe Attestation  By signing my name  below, I, Mariana Amaya   attest that this documentation has been prepared under the direction and in the presence of Rita Valencia MD.    Provider Attestation - Scribe documentation  All medical record entries made by the Scribe were at my direction and personally dictated by me. I have reviewed the chart and agree that the record accurately reflects my personal performance of the history, physical exam, discussion and plan.  1. PVD (peripheral vascular disease) (CMS/HCC)        2. Hypertension, unspecified type        3. Gout, unspecified cause, unspecified chronicity, unspecified site              Electronically Signed By: Rita Valencia MD   6/12/23  4:33 PM

## 2023-06-12 NOTE — PROGRESS NOTES
Subjective   Chief complaint: Servando Mercado is a 83 y.o. male who is a long term resident patient being seen and evaluated for multiple medical problems.  Patient presents for general medical care and follow-up    HPI:  Patient presents for general medical care and f/u.  Patient seen and examined at bedside.  No issues per nursing.  Patient has no acute complaints.  Denies chest pain and headache.  Patient with Hx of gout.  No recent flairs.  Patient with PVD, denies changes in the skin or decreased temperature. No acute distress.         Review of Systems  All systems reviewed and negative except for what was mentioned in the HPI    Vital signs:      124/77, 96%    Objective   Physical Exam  Constitutional:       General: He is not in acute distress.  Eyes:      Extraocular Movements: Extraocular movements intact.   Cardiovascular:      Rate and Rhythm: Regular rhythm.   Pulmonary:      Effort: Pulmonary effort is normal.      Breath sounds: Normal breath sounds.   Abdominal:      General: Bowel sounds are normal.      Palpations: Abdomen is soft.   Musculoskeletal:      Cervical back: Neck supple.      Right lower leg: No edema.      Left lower leg: No edema.   Neurological:      Mental Status: He is alert.   Psychiatric:         Mood and Affect: Mood normal.         Behavior: Behavior is cooperative.         Assessment/Plan   Problem List Items Addressed This Visit          Circulatory    Hypertension     BP at goal  Continue antihypertensives  Monitor BP         PVD (peripheral vascular disease) (CMS/[facility])     Wounds to RLE and LLE managed by facility wound care team  Dressings clean and intact  Monitor for S&S infection            Other    Gout     Stable  No recent flairs  Monitor for symptoms        Medications, treatments, and labs reviewed  Continue medications and treatments as listed in [facility]    Scribe Attestation  By signing my name below, IAmirah, Scribe   attest that this documentation has been  prepared under the direction and in the presence of Rita Valencia MD.    Provider Attestation - Scribe documentation  All medical record entries made by the Scribe were at my direction and personally dictated by me. I have reviewed the chart and agree that the record accurately reflects my personal performance of the history, physical exam, discussion and plan.  1. PVD (peripheral vascular disease) (CMS/HCC)        2. Hypertension, unspecified type        3. Gout, unspecified cause, unspecified chronicity, unspecified site

## 2023-06-22 NOTE — LETTER
Patient: Servando Mercado  : 1940    Encounter Date: 2023    PROGRESS NOTE    Subjective  Chief complaint: Servando Mercado is a 83 y.o. male who is a long term care patient being seen and evaluated for general medical care and follow up.    HPI:  Patient with HTN denies chest pain or headache. Labs reported by nurse WNL. Patient denies constitutional symptoms. No acute distress.      Objective  Vital signs: 123/76, 98%    Physical Exam  Constitutional:       General: He is not in acute distress.  Eyes:      Extraocular Movements: Extraocular movements intact.   Cardiovascular:      Rate and Rhythm: Normal rate and regular rhythm.   Pulmonary:      Effort: Pulmonary effort is normal.      Breath sounds: Normal breath sounds.   Abdominal:      General: Bowel sounds are normal.      Palpations: Abdomen is soft.   Musculoskeletal:      Cervical back: Neck supple.      Right lower leg: No edema.      Left lower leg: No edema.   Neurological:      Mental Status: He is alert.   Psychiatric:         Mood and Affect: Mood normal.         Behavior: Behavior is cooperative.         Assessment/Plan  Problem List Items Addressed This Visit          Circulatory    Hypertension     BP at goal  Continue antihypertensives  Monitor BP  Labs reviewed and WNL   Monitor labs routinely            Medications, treatments, and labs reviewed  Continue medications and treatments as listed in [facility]    Scribe Attestation  By signing my name below, I, Mariana Amaya   attest that this documentation has been prepared under the direction and in the presence of Rita Valencia MD.    Provider Attestation - Scribe documentation  All medical record entries made by the Scribe were at my direction and personally dictated by me. I have reviewed the chart and agree that the record accurately reflects my personal performance of the history, physical exam, discussion and plan.    1. Hypertension, unspecified type                Electronically Signed By: Rita Valencia MD   6/22/23  4:42 PM

## 2023-06-22 NOTE — ASSESSMENT & PLAN NOTE
BP at goal  Continue antihypertensives  Monitor BP  Labs reviewed and WNL   Monitor labs routinely

## 2023-06-22 NOTE — PROGRESS NOTES
PROGRESS NOTE    Subjective   Chief complaint: Servando Mercado is a 83 y.o. male who is a long term care patient being seen and evaluated for general medical care and follow up.    HPI:  Patient with HTN denies chest pain or headache. Labs reported by nurse WNL. Patient denies constitutional symptoms. No acute distress.      Objective   Vital signs: 123/76, 98%    Physical Exam  Constitutional:       General: He is not in acute distress.  Eyes:      Extraocular Movements: Extraocular movements intact.   Cardiovascular:      Rate and Rhythm: Normal rate and regular rhythm.   Pulmonary:      Effort: Pulmonary effort is normal.      Breath sounds: Normal breath sounds.   Abdominal:      General: Bowel sounds are normal.      Palpations: Abdomen is soft.   Musculoskeletal:      Cervical back: Neck supple.      Right lower leg: No edema.      Left lower leg: No edema.   Neurological:      Mental Status: He is alert.   Psychiatric:         Mood and Affect: Mood normal.         Behavior: Behavior is cooperative.         Assessment/Plan   Problem List Items Addressed This Visit          Circulatory    Hypertension     BP at goal  Continue antihypertensives  Monitor BP  Labs reviewed and WNL   Monitor labs routinely            Medications, treatments, and labs reviewed  Continue medications and treatments as listed in [facility]    Scribe Attestation  By signing my name below, I, Mariana Amaya   attest that this documentation has been prepared under the direction and in the presence of Rita Valencia MD.    Provider Attestation - Scribe documentation  All medical record entries made by the Scribe were at my direction and personally dictated by me. I have reviewed the chart and agree that the record accurately reflects my personal performance of the history, physical exam, discussion and plan.    1. Hypertension, unspecified type

## 2023-06-23 NOTE — PROGRESS NOTES
PROGRESS NOTE    Subjective   Chief complaint: Servando Mercado is a 83 y.o. male who is a long term care patient being seen and evaluated for neck pain/stiffness and difficulty swallowing.    HPI:   Patient being seen for complaint of neck pain/stiffness and difficulty swallowing.    Denies nausea, vomiting, fever and chills.      Objective   Vital signs:   18, 115/62, 97.8, 60, 98%  Physical Exam  Constitutional:       General: He is not in acute distress.  Eyes:      Extraocular Movements: Extraocular movements intact.   Pulmonary:      Effort: Pulmonary effort is normal.   Musculoskeletal:      Cervical back: Neck supple.   Neurological:      Mental Status: He is alert.   Psychiatric:         Mood and Affect: Mood normal.         Behavior: Behavior is cooperative.         Assessment/Plan   Problem List Items Addressed This Visit    None  Visit Diagnoses       Dysphagia, unspecified type    -  Primary    ENT consult speech therapy consult          Medications, treatments, and labs reviewed  Continue medications and treatments as listed in EMR    Scribe Attestation  Bre NUNEZ Scribe   attest that this documentation has been prepared under the direction and in the presence of JOANNA Louis    Provider Attestation - Scribe documentation  All medical record entries made by the Scribe were at my direction and personally dictated by me. I have reviewed the chart and agree that the record accurately reflects my personal performance of the history, physical exam, discussion and plan.   JOANNA Louis

## 2023-07-10 NOTE — LETTER
Patient: Servando Mercado  : 1940    Encounter Date: 07/10/2023    PROGRESS NOTE    Subjective  Chief complaint: Servando Mercado is a 83 y.o. male who is a long term care patient being seen and evaluated for monthly general medical care and follow-up    HPI:  Patient presents for general medical care and f/u.  Patient seen and examined at bedside.  No issues per nursing.  Patient has no acute complaints. Patient with Dx of HTN, denies chest pain and headache.  Patient with Hx of gout, no recent flairs.  Patient with PVD, denies changes in the skin or decreased temperature. No acute distress. No new concerns today.  Denies n/v/f/c pain.        Objective  Vital signs: 118/70,72,97%,98.0,18    Physical Exam  Constitutional:       General: He is not in acute distress.  Eyes:      Extraocular Movements: Extraocular movements intact.   Cardiovascular:      Rate and Rhythm: Normal rate and regular rhythm.   Pulmonary:      Effort: Pulmonary effort is normal.      Breath sounds: Normal breath sounds.   Abdominal:      General: Bowel sounds are normal.      Palpations: Abdomen is soft.   Musculoskeletal:      Cervical back: Neck supple.      Right lower leg: No edema.      Left lower leg: No edema.   Neurological:      Mental Status: He is alert.   Psychiatric:         Mood and Affect: Mood normal.         Behavior: Behavior is cooperative.         Assessment/Plan  Problem List Items Addressed This Visit       Hypertension     BP at goal  Continue antihypertensives  Monitor BP           Gout     Stable  Continue allopurinol  Monitor for symptoms         PVD (peripheral vascular disease) (CMS/[facility])     Stable  Continue aspirin & plavix          Medications, treatments, and labs reviewed  Continue medications and treatments as listed in EMR      Scribe Attestation  MAYRA, Eri Becerra, Mariana   attest that this documentation has been prepared under the direction and in the presence of Rita Valencia MD.     Provider  Attestation - Scribe documentation  All medical record entries made by the Scribe were at my direction and personally dictated by me. I have reviewed the chart and agree that the record accurately reflects my personal performance of the history, physical exam, discussion and plan.   Rita Valencia MD      Electronically Signed By: Rita Valencia MD   7/11/23  1:22 PM

## 2023-07-11 NOTE — PROGRESS NOTES
PROGRESS NOTE    Subjective   Chief complaint: Servando Mercado is a 83 y.o. male who is a long term care patient being seen and evaluated for monthly general medical care and follow-up    HPI:  Patient presents for general medical care and f/u.  Patient seen and examined at bedside.  No issues per nursing.  Patient has no acute complaints. Patient with Dx of HTN, denies chest pain and headache.  Patient with Hx of gout, no recent flairs.  Patient with PVD, denies changes in the skin or decreased temperature. No acute distress. No new concerns today.  Denies n/v/f/c pain.        Objective   Vital signs: 118/70,72,97%,98.0,18    Physical Exam  Constitutional:       General: He is not in acute distress.  Eyes:      Extraocular Movements: Extraocular movements intact.   Cardiovascular:      Rate and Rhythm: Normal rate and regular rhythm.   Pulmonary:      Effort: Pulmonary effort is normal.      Breath sounds: Normal breath sounds.   Abdominal:      General: Bowel sounds are normal.      Palpations: Abdomen is soft.   Musculoskeletal:      Cervical back: Neck supple.      Right lower leg: No edema.      Left lower leg: No edema.   Neurological:      Mental Status: He is alert.   Psychiatric:         Mood and Affect: Mood normal.         Behavior: Behavior is cooperative.         Assessment/Plan   Problem List Items Addressed This Visit       Hypertension     BP at goal  Continue antihypertensives  Monitor BP           Gout     Stable  Continue allopurinol  Monitor for symptoms         PVD (peripheral vascular disease) (CMS/[facility])     Stable  Continue aspirin & plavix          Medications, treatments, and labs reviewed  Continue medications and treatments as listed in EMR      Scribe Attestation  I, Mariana Schaffer   attest that this documentation has been prepared under the direction and in the presence of Rita Valencia MD.     Provider Attestation - Scribe documentation  All medical record entries made by the  Scribe were at my direction and personally dictated by me. I have reviewed the chart and agree that the record accurately reflects my personal performance of the history, physical exam, discussion and plan.   Rita Valencia MD

## 2023-08-14 NOTE — LETTER
Patient: Servando Mercado  : 1940    Encounter Date: 2023    PROGRESS NOTE    Subjective  Chief complaint: Servando Mercado is a 83 y.o. male who is a long term care patient being seen and evaluated for monthly general medical care and follow-up    HPI:  Patient presents for general medical care and f/u.  Patient seen and examined at bedside.  No issues per nursing.  Patient has no acute complaints. Patient with Dx of HTN, denies chest pain and headache.  Patient with Hx of gout, no recent flairs.  Patient with PVD, denies changes in the skin or decreased temperature. No acute distress. No new concerns today.  Denies n/v/f/c pain.        Objective  Vital signs: 121/70, 97%,    Physical Exam  Constitutional:       General: He is not in acute distress.  Eyes:      Extraocular Movements: Extraocular movements intact.   Cardiovascular:      Rate and Rhythm: Normal rate and regular rhythm.   Pulmonary:      Effort: Pulmonary effort is normal.      Breath sounds: Normal breath sounds.   Abdominal:      General: Bowel sounds are normal.      Palpations: Abdomen is soft.   Musculoskeletal:      Cervical back: Neck supple.      Right lower leg: No edema.      Left lower leg: No edema.   Neurological:      Mental Status: He is alert.   Psychiatric:         Mood and Affect: Mood normal.         Behavior: Behavior is cooperative.         Assessment/Plan  Problem List Items Addressed This Visit       Hypertension - Primary     BP at goal  Continue antihypertensives  Monitor BP           Gout     Stable  Continue allopurinol  Monitor for symptoms         PVD (peripheral vascular disease) (CMS/[facility])     Stable  Continue aspirin & plavix        Medications, treatments, and labs reviewed  Continue medications and treatments as listed in EMR      Scribe Attestation  I, garrett romero, Scribe   attest that this documentation has been prepared under the direction and in the presence of Rita Valencia MD.     Provider  Attestation - Scribe documentation  All medical record entries made by the Scribe were at my direction and personally dictated by me. I have reviewed the chart and agree that the record accurately reflects my personal performance of the history, physical exam, discussion and plan.   Rita Valencia MD  1. Hypertension, unspecified type        2. PVD (peripheral vascular disease) (CMS/HCC)        3. Gout, unspecified cause, unspecified chronicity, unspecified site              Electronically Signed By: Rita Valencia MD   8/14/23  6:23 PM

## 2023-08-14 NOTE — PROGRESS NOTES
PROGRESS NOTE    Subjective   Chief complaint: Servando Mercado is a 83 y.o. male who is a long term care patient being seen and evaluated for monthly general medical care and follow-up    HPI:  Patient presents for general medical care and f/u.  Patient seen and examined at bedside.  No issues per nursing.  Patient has no acute complaints. Patient with Dx of HTN, denies chest pain and headache.  Patient with Hx of gout, no recent flairs.  Patient with PVD, denies changes in the skin or decreased temperature. No acute distress. No new concerns today.  Denies n/v/f/c pain.        Objective   Vital signs: 121/70, 97%,    Physical Exam  Constitutional:       General: He is not in acute distress.  Eyes:      Extraocular Movements: Extraocular movements intact.   Cardiovascular:      Rate and Rhythm: Normal rate and regular rhythm.   Pulmonary:      Effort: Pulmonary effort is normal.      Breath sounds: Normal breath sounds.   Abdominal:      General: Bowel sounds are normal.      Palpations: Abdomen is soft.   Musculoskeletal:      Cervical back: Neck supple.      Right lower leg: No edema.      Left lower leg: No edema.   Neurological:      Mental Status: He is alert.   Psychiatric:         Mood and Affect: Mood normal.         Behavior: Behavior is cooperative.         Assessment/Plan   Problem List Items Addressed This Visit       Hypertension - Primary     BP at goal  Continue antihypertensives  Monitor BP           Gout     Stable  Continue allopurinol  Monitor for symptoms         PVD (peripheral vascular disease) (CMS/[facility])     Stable  Continue aspirin & plavix        Medications, treatments, and labs reviewed  Continue medications and treatments as listed in EMR      Scribe Attestation  I, garrett romero, Scribe   attest that this documentation has been prepared under the direction and in the presence of Rita Valencia MD.     Provider Attestation - Scribe documentation  All medical record entries made by the  Mariana were at my direction and personally dictated by me. I have reviewed the chart and agree that the record accurately reflects my personal performance of the history, physical exam, discussion and plan.   Rita Valencia MD  1. Hypertension, unspecified type        2. PVD (peripheral vascular disease) (CMS/HCC)        3. Gout, unspecified cause, unspecified chronicity, unspecified site

## 2023-09-01 PROBLEM — R50.9 FEVER: Status: ACTIVE | Noted: 2023-01-01

## 2023-09-01 NOTE — PROGRESS NOTES
PROGRESS NOTE    Subjective   Chief complaint: Servando Mercado is a 83 y.o. male who is a long term care patient being seen and evaluated for fever    HPI:  Patient presented with a fever. CBC BMP obtained and WNL. UA is pending. Denies chills, diarrhea, nausea or vomiting. Denies chest pain or headache.     Objective   Vital signs: 119/64, 98%    Physical Exam  Constitutional:       General: He is not in acute distress.  Eyes:      Extraocular Movements: Extraocular movements intact.   Cardiovascular:      Rate and Rhythm: Normal rate and regular rhythm.   Pulmonary:      Effort: Pulmonary effort is normal.      Breath sounds: Normal breath sounds.   Abdominal:      General: Bowel sounds are normal.      Palpations: Abdomen is soft.   Musculoskeletal:      Cervical back: Neck supple.      Right lower leg: No edema.      Left lower leg: No edema.   Neurological:      Mental Status: He is alert.   Psychiatric:         Mood and Affect: Mood normal.         Behavior: Behavior is cooperative.       Assessment/Plan   Problem List Items Addressed This Visit       Hypertension - Primary     BP at goal  Continue antihypertensives  Monitor BP           Fever     Labs obtained and unremarkable  UA pending          Medications, treatments, and labs reviewed  Continue medications and treatments as listed in [facility]    Scribe Attestation  By signing my name below, IAmirah Scribe   attest that this documentation has been prepared under the direction and in the presence of Rita Valencia MD.    Provider Attestation - Scribe documentation  All medical record entries made by the Scribe were at my direction and personally dictated by me. I have reviewed the chart and agree that the record accurately reflects my personal performance of the history, physical exam, discussion and plan.    1. Hypertension, unspecified type        2. Fever, unspecified fever cause

## 2023-09-01 NOTE — LETTER
Patient: Servando Mercado  : 1940    Encounter Date: 2023    PROGRESS NOTE    Subjective  Chief complaint: Servando Mercado is a 83 y.o. male who is a long term care patient being seen and evaluated for fever    HPI:  Patient presented with a fever. CBC BMP obtained and WNL. UA is pending. Denies chills, diarrhea, nausea or vomiting. Denies chest pain or headache.     Objective  Vital signs: 119/64, 98%    Physical Exam  Constitutional:       General: He is not in acute distress.  Eyes:      Extraocular Movements: Extraocular movements intact.   Cardiovascular:      Rate and Rhythm: Normal rate and regular rhythm.   Pulmonary:      Effort: Pulmonary effort is normal.      Breath sounds: Normal breath sounds.   Abdominal:      General: Bowel sounds are normal.      Palpations: Abdomen is soft.   Musculoskeletal:      Cervical back: Neck supple.      Right lower leg: No edema.      Left lower leg: No edema.   Neurological:      Mental Status: He is alert.   Psychiatric:         Mood and Affect: Mood normal.         Behavior: Behavior is cooperative.       Assessment/Plan  Problem List Items Addressed This Visit       Hypertension - Primary     BP at goal  Continue antihypertensives  Monitor BP           Fever     Labs obtained and unremarkable  UA pending          Medications, treatments, and labs reviewed  Continue medications and treatments as listed in [facility]    Scribe Attestation  By signing my name below, I, Mariana Amaya   attest that this documentation has been prepared under the direction and in the presence of Rita Valencia MD.    Provider Attestation - Scribe documentation  All medical record entries made by the Scribe were at my direction and personally dictated by me. I have reviewed the chart and agree that the record accurately reflects my personal performance of the history, physical exam, discussion and plan.    1. Hypertension, unspecified type        2. Fever, unspecified fever  cause               Electronically Signed By: Rita Valencia MD   9/6/23  5:51 PM

## 2023-09-07 NOTE — LETTER
Patient: Servando Mercado  : 1940    Encounter Date: 2023    PROGRESS NOTE    Subjective  Chief complaint: Servando Mercado is a 83 y.o. male who is a long term care patient being seen and evaluated for fever    HPI:  Patient presented with complaints of fever and increased weakness. Found to have UTI and given IM Rocephin. Denies adverse effects from ATB. Denies chest pain or headache. No acute distress.       Objective  Vital signs: 118/72, 98%    Physical Exam  Constitutional:       General: He is not in acute distress.  Eyes:      Extraocular Movements: Extraocular movements intact.   Cardiovascular:      Rate and Rhythm: Normal rate and regular rhythm.   Pulmonary:      Effort: Pulmonary effort is normal.      Breath sounds: Normal breath sounds.   Abdominal:      General: Bowel sounds are normal.      Palpations: Abdomen is soft.   Musculoskeletal:      Cervical back: Neck supple.      Right lower leg: No edema.      Left lower leg: No edema.   Neurological:      Mental Status: He is alert.   Psychiatric:         Mood and Affect: Mood normal.         Behavior: Behavior is cooperative.         Assessment/Plan  Problem List Items Addressed This Visit       Hypertension - Primary     BP at goal  Continue antihypertensives  Monitor BP           UTI (urinary tract infection)     IM Rocephin           Medications, treatments, and labs reviewed  Continue medications and treatments as listed in [facility]    Scribe Attestation  By signing my name below, I, Mariana Amaya   attest that this documentation has been prepared under the direction and in the presence of Rita Valencia MD.    Provider Attestation - Scribe documentation  All medical record entries made by the Scribe were at my direction and personally dictated by me. I have reviewed the chart and agree that the record accurately reflects my personal performance of the history, physical exam, discussion and plan.    1. Hypertension, unspecified  type        2. Urinary tract infection without hematuria, site unspecified               Electronically Signed By: Rita Valencia MD   9/8/23  5:52 PM

## 2023-09-08 PROBLEM — N39.0 UTI (URINARY TRACT INFECTION): Status: ACTIVE | Noted: 2023-01-01

## 2023-09-08 NOTE — PROGRESS NOTES
PROGRESS NOTE    Subjective   Chief complaint: Servando Mercado is a 83 y.o. male who is a long term care patient being seen and evaluated for fever    HPI:  Patient presented with complaints of fever and increased weakness. Found to have UTI and given IM Rocephin. Denies adverse effects from ATB. Denies chest pain or headache. No acute distress.       Objective   Vital signs: 118/72, 98%    Physical Exam  Constitutional:       General: He is not in acute distress.  Eyes:      Extraocular Movements: Extraocular movements intact.   Cardiovascular:      Rate and Rhythm: Normal rate and regular rhythm.   Pulmonary:      Effort: Pulmonary effort is normal.      Breath sounds: Normal breath sounds.   Abdominal:      General: Bowel sounds are normal.      Palpations: Abdomen is soft.   Musculoskeletal:      Cervical back: Neck supple.      Right lower leg: No edema.      Left lower leg: No edema.   Neurological:      Mental Status: He is alert.   Psychiatric:         Mood and Affect: Mood normal.         Behavior: Behavior is cooperative.         Assessment/Plan   Problem List Items Addressed This Visit       Hypertension - Primary     BP at goal  Continue antihypertensives  Monitor BP           UTI (urinary tract infection)     IM Rocephin           Medications, treatments, and labs reviewed  Continue medications and treatments as listed in [facility]    Scribe Attestation  By signing my name below, IAmirah Scribe   attest that this documentation has been prepared under the direction and in the presence of Rita Valencia MD.    Provider Attestation - Scribe documentation  All medical record entries made by the Scribe were at my direction and personally dictated by me. I have reviewed the chart and agree that the record accurately reflects my personal performance of the history, physical exam, discussion and plan.    1. Hypertension, unspecified type        2. Urinary tract infection without hematuria, site unspecified

## 2023-09-14 NOTE — PROGRESS NOTES
Subjective   Chief complaint: Servando Mercado is a 83 y.o. male who is a long term resident patient being seen and evaluated for multiple medical problems.  Patient presents for general medical care and follow-up    HPI:  Patient presents for general medical care and f/u.  Patient seen and examined at bedside.  No issues per nursing.  Patient has no acute complaints.  Denies chest pain and headache.  Patient with Hx of gout.  No recent flairs.  Patient with PVD, denies changes in the skin or decreased temperature. No acute distress.         Review of Systems  All systems reviewed and negative except for what was mentioned in the HPI    Vital signs:      128/74, 97%    Objective   Physical Exam  Constitutional:       General: He is not in acute distress.  Eyes:      Extraocular Movements: Extraocular movements intact.   Cardiovascular:      Rate and Rhythm: Regular rhythm.   Pulmonary:      Effort: Pulmonary effort is normal.      Breath sounds: Normal breath sounds.   Abdominal:      General: Bowel sounds are normal.      Palpations: Abdomen is soft.   Musculoskeletal:      Cervical back: Neck supple.      Right lower leg: No edema.      Left lower leg: No edema.   Neurological:      Mental Status: He is alert.   Psychiatric:         Mood and Affect: Mood normal.         Behavior: Behavior is cooperative.         Assessment/Plan   Problem List Items Addressed This Visit       Hypertension     BP at goal  Continue antihypertensives  Monitor BP           Gout     Stable  Continue allopurinol  Monitor for symptoms         PVD (peripheral vascular disease) (CMS/[facility]) - Primary     Stable  Continue aspirin & plavix        Medications, treatments, and labs reviewed  Continue medications and treatments as listed in [facility]    Scribe Attestation  By signing my name below, Amirah NUNEZ, Scribe   attest that this documentation has been prepared under the direction and in the presence of Rita Valencia MD.    Provider  Attestation - Scribe documentation  All medical record entries made by the Scribe were at my direction and personally dictated by me. I have reviewed the chart and agree that the record accurately reflects my personal performance of the history, physical exam, discussion and plan.  1. PVD (peripheral vascular disease) (CMS/HCC)        2. Gout, unspecified cause, unspecified chronicity, unspecified site        3. Hypertension, unspecified type

## 2023-09-14 NOTE — LETTER
Patient: Servando Mercado  : 1940    Encounter Date: 2023    Subjective  Chief complaint: Servando Mercado is a 83 y.o. male who is a long term resident patient being seen and evaluated for multiple medical problems.  Patient presents for general medical care and follow-up    HPI:  Patient presents for general medical care and f/u.  Patient seen and examined at bedside.  No issues per nursing.  Patient has no acute complaints.  Denies chest pain and headache.  Patient with Hx of gout.  No recent flairs.  Patient with PVD, denies changes in the skin or decreased temperature. No acute distress.         Review of Systems  All systems reviewed and negative except for what was mentioned in the HPI    Vital signs:      128/74, 97%    Objective  Physical Exam  Constitutional:       General: He is not in acute distress.  Eyes:      Extraocular Movements: Extraocular movements intact.   Cardiovascular:      Rate and Rhythm: Regular rhythm.   Pulmonary:      Effort: Pulmonary effort is normal.      Breath sounds: Normal breath sounds.   Abdominal:      General: Bowel sounds are normal.      Palpations: Abdomen is soft.   Musculoskeletal:      Cervical back: Neck supple.      Right lower leg: No edema.      Left lower leg: No edema.   Neurological:      Mental Status: He is alert.   Psychiatric:         Mood and Affect: Mood normal.         Behavior: Behavior is cooperative.         Assessment/Plan  Problem List Items Addressed This Visit       Hypertension     BP at goal  Continue antihypertensives  Monitor BP           Gout     Stable  Continue allopurinol  Monitor for symptoms         PVD (peripheral vascular disease) (CMS/[facility]) - Primary     Stable  Continue aspirin & plavix        Medications, treatments, and labs reviewed  Continue medications and treatments as listed in [facility]    Scribe Attestation  By signing my name below, I, Amirah Philippe, Mariana   attest that this documentation has been prepared under the  direction and in the presence of Rita Valencia MD.    Provider Attestation - Scribe documentation  All medical record entries made by the Scribe were at my direction and personally dictated by me. I have reviewed the chart and agree that the record accurately reflects my personal performance of the history, physical exam, discussion and plan.  1. PVD (peripheral vascular disease) (CMS/HCC)        2. Gout, unspecified cause, unspecified chronicity, unspecified site        3. Hypertension, unspecified type              Electronically Signed By: Rita Valencia MD   9/14/23  1:30 PM

## 2023-09-18 PROBLEM — S81.812A NONINFECTED SKIN TEAR OF LOWER EXTREMITY, LEFT, INITIAL ENCOUNTER: Status: ACTIVE | Noted: 2023-01-01

## 2023-09-18 NOTE — PROGRESS NOTES
PROGRESS NOTE    Subjective   Chief complaint: Servando Mercado is a 83 y.o. male who is a long term care patient being seen and evaluated for ST.     HPI:  Nursing reported that patient has ST to left pinky toe that was continuously bleeding. Upon assessment bleeding has stopped. No other nursing concerns.       Objective   Vital signs: 124/65,79,96% RA    Physical Exam  Constitutional:       General: He is not in acute distress.  Eyes:      Extraocular Movements: Extraocular movements intact.   Cardiovascular:      Rate and Rhythm: Regular rhythm.   Pulmonary:      Effort: Pulmonary effort is normal.      Breath sounds: Normal breath sounds.   Abdominal:      General: Bowel sounds are normal.      Palpations: Abdomen is soft.   Musculoskeletal:      Cervical back: Neck supple.      Right lower leg: No edema.      Left lower leg: No edema.   Feet:      Comments: ST to left pinky toe  Neurological:      Mental Status: He is alert.   Psychiatric:         Mood and Affect: Mood normal.         Behavior: Behavior is cooperative.         Assessment/Plan   Problem List Items Addressed This Visit       Hypertension     BP at goal  Continue antihypertensives  Monitor BP           Gout     Stable  Continue allopurinol  Monitor for symptoms         Noninfected skin tear of lower extremity, left, initial encounter     Stable  Tx in place           Medications, treatments, and labs reviewed  Continue medications and treatments as listed in EMR      Scribe Attestation  Eri NUNEZ Scribe   attest that this documentation has been prepared under the direction and in the presence of Rita Valencia MD.     Provider Attestation - Scribe documentation  All medical record entries made by the Scribe were at my direction and personally dictated by me. I have reviewed the chart and agree that the record accurately reflects my personal performance of the history, physical exam, discussion and plan.   Rita Valencia MD

## 2023-09-18 NOTE — LETTER
Patient: Servando Mercado  : 1940    Encounter Date: 2023    PROGRESS NOTE    Subjective  Chief complaint: Servando Mercado is a 83 y.o. male who is a long term care patient being seen and evaluated for ST.     HPI:  Nursing reported that patient has ST to left pinky toe that was continuously bleeding. Upon assessment bleeding has stopped. No other nursing concerns.       Objective  Vital signs: 124/65,79,96% RA    Physical Exam  Constitutional:       General: He is not in acute distress.  Eyes:      Extraocular Movements: Extraocular movements intact.   Cardiovascular:      Rate and Rhythm: Regular rhythm.   Pulmonary:      Effort: Pulmonary effort is normal.      Breath sounds: Normal breath sounds.   Abdominal:      General: Bowel sounds are normal.      Palpations: Abdomen is soft.   Musculoskeletal:      Cervical back: Neck supple.      Right lower leg: No edema.      Left lower leg: No edema.   Feet:      Comments: ST to left pinky toe  Neurological:      Mental Status: He is alert.   Psychiatric:         Mood and Affect: Mood normal.         Behavior: Behavior is cooperative.         Assessment/Plan  Problem List Items Addressed This Visit       Hypertension     BP at goal  Continue antihypertensives  Monitor BP           Gout     Stable  Continue allopurinol  Monitor for symptoms         Noninfected skin tear of lower extremity, left, initial encounter     Stable  Tx in place           Medications, treatments, and labs reviewed  Continue medications and treatments as listed in EMR      Scribe Attestation  IEri Scribe   attest that this documentation has been prepared under the direction and in the presence of Rita Valencia MD.     Provider Attestation - Scribe documentation  All medical record entries made by the Scribe were at my direction and personally dictated by me. I have reviewed the chart and agree that the record accurately reflects my personal performance of the  history, physical exam, discussion and plan.   Rita Valencia MD      Electronically Signed By: Rita Valencia MD   9/18/23  4:10 PM

## 2023-09-21 PROBLEM — S91.319A CUT OF FOOT: Status: ACTIVE | Noted: 2023-01-01

## 2023-09-21 NOTE — LETTER
Patient: Servando Mercado  : 1940    Encounter Date: 2023    PROGRESS NOTE    Subjective  Chief complaint: Servando Mercado is a 83 y.o. male who is a long term care patient being seen and evaluated for open area    HPI:  .  Nurse reported patient with small open area to his left pinky toe.  Patient denies pain associated.   Patient denies chest pain or headache.  No other constitutional symptoms noted.      Objective  Vital signs:  121/76, 98%    Physical Exam  Constitutional:       General: He is not in acute distress.  Eyes:      Extraocular Movements: Extraocular movements intact.   Cardiovascular:      Rate and Rhythm: Normal rate and regular rhythm.   Pulmonary:      Effort: Pulmonary effort is normal.      Breath sounds: Normal breath sounds.   Abdominal:      General: Bowel sounds are normal.      Palpations: Abdomen is soft.   Musculoskeletal:         General: Normal range of motion.      Cervical back: Neck supple.      Right lower leg: No edema.      Left lower leg: No edema.   Skin:     Comments: Left pinky toe small open area   Neurological:      Mental Status: He is alert.   Psychiatric:         Mood and Affect: Mood normal.         Behavior: Behavior is cooperative.         Assessment/Plan  Problem List Items Addressed This Visit       Hypertension - Primary     BP at goal  Continue antihypertensives  Monitor BP           Cut of foot     Left 5th toe  Change dressing daily monitor for infection            Medications, treatments, and labs reviewed  Continue medications and treatments as listed in [facility]    Scribe Attestation  By signing my name below, IAmirah Scribe   attest that this documentation has been prepared under the direction and in the presence of Rita Valencia MD.    Provider Attestation - Scribe documentation  All medical record entries made by the Scribe were at my direction and personally dictated by me. I have reviewed the chart and agree that the record accurately  reflects my personal performance of the history, physical exam, discussion and plan.    1. Hypertension, unspecified type        2. Cut of foot               Electronically Signed By: Rita Valencia MD   9/21/23  3:00 PM

## 2023-09-21 NOTE — PROGRESS NOTES
PROGRESS NOTE    Subjective   Chief complaint: Servando Mercado is a 83 y.o. male who is a long term care patient being seen and evaluated for open area    HPI:  .  Nurse reported patient with small open area to his left pinky toe.  Patient denies pain associated.   Patient denies chest pain or headache.  No other constitutional symptoms noted.      Objective   Vital signs:  121/76, 98%    Physical Exam  Constitutional:       General: He is not in acute distress.  Eyes:      Extraocular Movements: Extraocular movements intact.   Cardiovascular:      Rate and Rhythm: Normal rate and regular rhythm.   Pulmonary:      Effort: Pulmonary effort is normal.      Breath sounds: Normal breath sounds.   Abdominal:      General: Bowel sounds are normal.      Palpations: Abdomen is soft.   Musculoskeletal:         General: Normal range of motion.      Cervical back: Neck supple.      Right lower leg: No edema.      Left lower leg: No edema.   Skin:     Comments: Left pinky toe small open area   Neurological:      Mental Status: He is alert.   Psychiatric:         Mood and Affect: Mood normal.         Behavior: Behavior is cooperative.         Assessment/Plan   Problem List Items Addressed This Visit       Hypertension - Primary     BP at goal  Continue antihypertensives  Monitor BP           Cut of foot     Left 5th toe  Change dressing daily monitor for infection            Medications, treatments, and labs reviewed  Continue medications and treatments as listed in [facility]    Scribe Attestation  By signing my name below, I, Mariana Amaya   attest that this documentation has been prepared under the direction and in the presence of Rita Valencia MD.    Provider Attestation - Scribe documentation  All medical record entries made by the Scribe were at my direction and personally dictated by me. I have reviewed the chart and agree that the record accurately reflects my personal performance of the history, physical exam,  discussion and plan.    1. Hypertension, unspecified type        2. Cut of foot

## 2023-10-09 NOTE — LETTER
Patient: Servando Mercado  : 1940    Encounter Date: 10/09/2023    PROGRESS NOTE    Subjective  Chief complaint: Servando Mercado is a 83 y.o. male who is a long term care patient being seen and evaluated for monthly general medical care and follow-up.    HPI:  Patient presents for general medical care and f/u.  Patient seen and examined at bedside.  No issues per nursing.  Patient has no acute complaints. Patient with Dx of HTN, denies chest pain and headache.  Patient with Hx of gout, no recent flairs.  Patient with PVD, denies changes in the skin or decreased temperature. No acute distress. No new concerns today.  Denies n/v/f/c pain.        Objective  Vital signs: 124/65,79,96%    Physical Exam  Constitutional:       General: He is not in acute distress.  Eyes:      Extraocular Movements: Extraocular movements intact.   Cardiovascular:      Rate and Rhythm: Normal rate and regular rhythm.   Pulmonary:      Effort: Pulmonary effort is normal.      Breath sounds: Normal breath sounds.   Abdominal:      General: Bowel sounds are normal.      Palpations: Abdomen is soft.   Musculoskeletal:         General: Normal range of motion.      Cervical back: Neck supple.      Right lower leg: No edema.      Left lower leg: No edema.   Neurological:      Mental Status: He is alert.   Psychiatric:         Mood and Affect: Mood normal.         Behavior: Behavior is cooperative.         Assessment/Plan  Problem List Items Addressed This Visit       Hypertension     BP at goal  Continue antihypertensives  Monitor BP           Gout     Stable  Continue allopurinol  Monitor for symptoms         PVD (peripheral vascular disease) (CMS/[facility])     Stable  Continue aspirin & plavix          Medications, treatments, and labs reviewed  Continue medications and treatments as listed in EMR      Scribe Attestation  I, Mariana Schaffer   attest that this documentation has been prepared under the direction and in the presence of  Rita Valencia MD.     Provider Attestation - Scribe documentation  All medical record entries made by the Scribe were at my direction and personally dictated by me. I have reviewed the chart and agree that the record accurately reflects my personal performance of the history, physical exam, discussion and plan.   Rita Valencia MD      Electronically Signed By: Rita Valencia MD   10/9/23  5:37 PM

## 2023-10-09 NOTE — PROGRESS NOTES
PROGRESS NOTE    Subjective   Chief complaint: Servando Mercado is a 83 y.o. male who is a long term care patient being seen and evaluated for monthly general medical care and follow-up.    HPI:  Patient presents for general medical care and f/u.  Patient seen and examined at bedside.  No issues per nursing.  Patient has no acute complaints. Patient with Dx of HTN, denies chest pain and headache.  Patient with Hx of gout, no recent flairs.  Patient with PVD, denies changes in the skin or decreased temperature. No acute distress. No new concerns today.  Denies n/v/f/c pain.        Objective   Vital signs: 124/65,79,96%    Physical Exam  Constitutional:       General: He is not in acute distress.  Eyes:      Extraocular Movements: Extraocular movements intact.   Cardiovascular:      Rate and Rhythm: Normal rate and regular rhythm.   Pulmonary:      Effort: Pulmonary effort is normal.      Breath sounds: Normal breath sounds.   Abdominal:      General: Bowel sounds are normal.      Palpations: Abdomen is soft.   Musculoskeletal:         General: Normal range of motion.      Cervical back: Neck supple.      Right lower leg: No edema.      Left lower leg: No edema.   Neurological:      Mental Status: He is alert.   Psychiatric:         Mood and Affect: Mood normal.         Behavior: Behavior is cooperative.         Assessment/Plan   Problem List Items Addressed This Visit       Hypertension     BP at goal  Continue antihypertensives  Monitor BP           Gout     Stable  Continue allopurinol  Monitor for symptoms         PVD (peripheral vascular disease) (CMS/[facility])     Stable  Continue aspirin & plavix          Medications, treatments, and labs reviewed  Continue medications and treatments as listed in EMR      Scribe Attestation  I, Mariana Schaffer   attest that this documentation has been prepared under the direction and in the presence of Rita Valencia MD.     Provider Attestation - Scribe documentation  All  medical record entries made by the Scribe were at my direction and personally dictated by me. I have reviewed the chart and agree that the record accurately reflects my personal performance of the history, physical exam, discussion and plan.   Rita Valencia MD

## 2023-10-18 NOTE — LETTER
Patient: Servando Mercado  : 1940    Encounter Date: 10/18/2023    PROGRESS NOTE    Subjective  Chief complaint: Servando Mercado is a 83 y.o. male who is a long term care patient being seen and evaluated for follow up.    HPI:  HPI review gradual dose reduction of Pepcid down to 20 mg  Objective  Vital signs: 120/75, 97.2, 70, 18, 97%    Physical Exam  Constitutional:       General: He is not in acute distress.  Eyes:      Extraocular Movements: Extraocular movements intact.   Pulmonary:      Effort: Pulmonary effort is normal.   Musculoskeletal:      Cervical back: Neck supple.   Neurological:      Mental Status: He is alert.   Psychiatric:         Mood and Affect: Mood normal.         Behavior: Behavior is cooperative.         Assessment/Plan  Problem List Items Addressed This Visit       Gastroesophageal reflux disease without esophagitis - Primary     Decrease Pepcid to 20 mg          Medications, treatments, and labs reviewed  Continue medications and treatments as listed in [facility]    Scribe Attestation  Anastasiya NUNEZ Scribe   attest that this documentation has been prepared under the direction and in the presence of JOANNA Louis.    Provider Attestation - Scribe documentation  All medical record entries made by the Scribe were at my direction and personally dictated by me. I have reviewed the chart and agree that the record accurately reflects my personal performance of the history, physical exam, discussion and plan.    JOANNA Louis          Electronically Signed By: JOANNA Louis   23 11:23 PM

## 2023-10-25 NOTE — LETTER
Patient: Servando Mercado  : 1940    Encounter Date: 10/25/2023    PROGRESS NOTE    Subjective  Chief complaint: Servando Mercado is a 83 y.o. male who is a long term care patient being seen and evaluated for cellulitis    HPI:  HPI nurse notified me patient on antibiotic for left lower extremity cellulitis patient has healing wound and erythema to left lower extremity and is on Keflex  Objective  Vital signs: 128/54, 96.8, 75, 96%    Physical Exam  Constitutional:       General: He is not in acute distress.  Eyes:      Extraocular Movements: Extraocular movements intact.   Pulmonary:      Effort: Pulmonary effort is normal.   Musculoskeletal:      Cervical back: Neck supple.   Neurological:      Mental Status: He is alert.   Psychiatric:         Mood and Affect: Mood normal.         Behavior: Behavior is cooperative.         Assessment/Plan  Problem List Items Addressed This Visit       Cellulitis - Primary     Continue Keflex          Medications, treatments, and labs reviewed  Continue medications and treatments as listed in [facility]    Scribe Attestation  Anastasiya NUNEZ Scribe   attest that this documentation has been prepared under the direction and in the presence of JOANNA Louis.    Provider Attestation - Scribe documentation  All medical record entries made by the Scribe were at my direction and personally dictated by me. I have reviewed the chart and agree that the record accurately reflects my personal performance of the history, physical exam, discussion and plan.    JOANNA Louis          Electronically Signed By: JOANNA Louis   23  9:04 PM

## 2023-10-26 PROBLEM — L03.90 CELLULITIS: Status: ACTIVE | Noted: 2023-01-01

## 2023-10-26 PROBLEM — E53.8 B12 DEFICIENCY: Status: ACTIVE | Noted: 2023-01-01

## 2023-10-26 NOTE — PROGRESS NOTES
PROGRESS NOTE    Subjective   Chief complaint: Servando Mercado is a 83 y.o. male who is a long term care patient being seen and evaluated for cellulitis    HPI:  Patient continues on ATB for Cellulitis. No adverse effects noted from ATB therapy. Denies pain or discomfort. Denies fever or chills. Denies chest pain or headache. Patient has been refusing to take his B12 tablet. B12 level WNL. No acute distress.       Objective   Vital signs: 128/64, 98%    Physical Exam  Constitutional:       General: He is not in acute distress.  Eyes:      Extraocular Movements: Extraocular movements intact.   Cardiovascular:      Rate and Rhythm: Normal rate and regular rhythm.   Pulmonary:      Effort: Pulmonary effort is normal.      Breath sounds: Normal breath sounds.   Abdominal:      General: Bowel sounds are normal.      Palpations: Abdomen is soft.   Musculoskeletal:      Cervical back: Neck supple.      Right lower leg: No edema.      Left lower leg: No edema.   Neurological:      Mental Status: He is alert.   Psychiatric:         Mood and Affect: Mood normal.         Behavior: Behavior is cooperative.         Assessment/Plan   Problem List Items Addressed This Visit       Hypertension - Primary     BP at goal  Continue antihypertensives  Monitor BP           Cellulitis     Continue ATB until complete         B12 deficiency     B12 level WNL  DC B12  Patient refusing          Medications, treatments, and labs reviewed  Continue medications and treatments as listed in [facility]    Scribe Attestation  By signing my name below, I, Mariana Amaya   attest that this documentation has been prepared under the direction and in the presence of Rita Valencia MD.    Provider Attestation - Scribe documentation  All medical record entries made by the Scribe were at my direction and personally dictated by me. I have reviewed the chart and agree that the record accurately reflects my personal performance of the history, physical exam,  discussion and plan.    1. Hypertension, unspecified type        2. Cellulitis of lower extremity, unspecified laterality        3. B12 deficiency

## 2023-10-26 NOTE — LETTER
Patient: Servando Mercado  : 1940    Encounter Date: 10/26/2023    PROGRESS NOTE    Subjective  Chief complaint: Servando Mercado is a 83 y.o. male who is a long term care patient being seen and evaluated for cellulitis    HPI:  Patient continues on ATB for Cellulitis. No adverse effects noted from ATB therapy. Denies pain or discomfort. Denies fever or chills. Denies chest pain or headache. Patient has been refusing to take his B12 tablet. B12 level WNL. No acute distress.       Objective  Vital signs: 128/64, 98%    Physical Exam  Constitutional:       General: He is not in acute distress.  Eyes:      Extraocular Movements: Extraocular movements intact.   Cardiovascular:      Rate and Rhythm: Normal rate and regular rhythm.   Pulmonary:      Effort: Pulmonary effort is normal.      Breath sounds: Normal breath sounds.   Abdominal:      General: Bowel sounds are normal.      Palpations: Abdomen is soft.   Musculoskeletal:      Cervical back: Neck supple.      Right lower leg: No edema.      Left lower leg: No edema.   Neurological:      Mental Status: He is alert.   Psychiatric:         Mood and Affect: Mood normal.         Behavior: Behavior is cooperative.         Assessment/Plan  Problem List Items Addressed This Visit       Hypertension - Primary     BP at goal  Continue antihypertensives  Monitor BP           Cellulitis     Continue ATB until complete         B12 deficiency     B12 level WNL  DC B12  Patient refusing          Medications, treatments, and labs reviewed  Continue medications and treatments as listed in [facility]    Scribe Attestation  By signing my name below, IAmirah Scribe   attest that this documentation has been prepared under the direction and in the presence of Rita Valencia MD.    Provider Attestation - Scribe documentation  All medical record entries made by the Scribe were at my direction and personally dictated by me. I have reviewed the chart and agree that the record  accurately reflects my personal performance of the history, physical exam, discussion and plan.    1. Hypertension, unspecified type        2. Cellulitis of lower extremity, unspecified laterality        3. B12 deficiency               Electronically Signed By: Rita Valencia MD   10/26/23  8:49 PM

## 2023-10-30 PROBLEM — R53.1 WEAKNESS: Status: ACTIVE | Noted: 2023-01-01

## 2023-10-30 NOTE — PROGRESS NOTES
PROGRESS NOTE    Subjective   Chief complaint: Servando Mercado is a 83 y.o. male who is a long term care patient being seen and evaluated for follow up.    HPI:  HPI review gradual dose reduction of Pepcid down to 20 mg  Objective   Vital signs: 120/75, 97.2, 70, 18, 97%    Physical Exam  Constitutional:       General: He is not in acute distress.  Eyes:      Extraocular Movements: Extraocular movements intact.   Pulmonary:      Effort: Pulmonary effort is normal.   Musculoskeletal:      Cervical back: Neck supple.   Neurological:      Mental Status: He is alert.   Psychiatric:         Mood and Affect: Mood normal.         Behavior: Behavior is cooperative.         Assessment/Plan   Problem List Items Addressed This Visit       Gastroesophageal reflux disease without esophagitis - Primary     Decrease Pepcid to 20 mg          Medications, treatments, and labs reviewed  Continue medications and treatments as listed in [facility]    Scribe Attestation  IAnastasiya Scribe   attest that this documentation has been prepared under the direction and in the presence of JOANNA Louis.    Provider Attestation - Scribe documentation  All medical record entries made by the Scribe were at my direction and personally dictated by me. I have reviewed the chart and agree that the record accurately reflects my personal performance of the history, physical exam, discussion and plan.    JOANNA Louis

## 2023-10-30 NOTE — LETTER
Patient: Servando Mercado  : 1940    Encounter Date: 10/30/2023    PROGRESS NOTE    Subjective  Chief complaint: Servando Mercado is a 83 y.o. male who is a long term care patient being seen and evaluated for weakness.     HPI:  Patient has been working in therapy to improve strength, endurance, and ADLs.  Patient continues to work toward goals.  Patient continues on ATB therapy for cellulitis. No adverse reactions noted, remains afebrile. No new concerns today.  Denies n/v/f/c pain.        Objective  Vital signs: 120/75,70,97%    Physical Exam  Constitutional:       General: He is not in acute distress.  Eyes:      Extraocular Movements: Extraocular movements intact.   Cardiovascular:      Rate and Rhythm: Normal rate and regular rhythm.   Pulmonary:      Effort: Pulmonary effort is normal.      Breath sounds: Normal breath sounds.   Abdominal:      General: Bowel sounds are normal.      Palpations: Abdomen is soft.   Musculoskeletal:      Cervical back: Neck supple.      Right lower leg: No edema.      Left lower leg: No edema.   Neurological:      Mental Status: He is alert.   Psychiatric:         Mood and Affect: Mood normal.         Behavior: Behavior is cooperative.         Assessment/Plan  Problem List Items Addressed This Visit       Hypertension     BP at goal  Continue antihypertensives  Monitor BP           Cellulitis     Continue ATB therapy  Stop date 23  Monitor symptoms         Weakness     Continue therapy          Medications, treatments, and labs reviewed  Continue medications and treatments as listed in EMR      Scribe Attestation  Eri NUNEZ Scribe   attest that this documentation has been prepared under the direction and in the presence of Rita Valencia MD.     Provider Attestation - Scribe documentation  All medical record entries made by the Scribe were at my direction and personally dictated by me. I have reviewed the chart and agree that the record accurately reflects  my personal performance of the history, physical exam, discussion and plan.   Rita Valencia MD      Electronically Signed By: Rita Valencia MD   10/30/23  8:58 PM

## 2023-10-30 NOTE — PROGRESS NOTES
PROGRESS NOTE    Subjective   Chief complaint: Servando Mercado is a 83 y.o. male who is a long term care patient being seen and evaluated for weakness.     HPI:  Patient has been working in therapy to improve strength, endurance, and ADLs.  Patient continues to work toward goals.  Patient continues on ATB therapy for cellulitis. No adverse reactions noted, remains afebrile. No new concerns today.  Denies n/v/f/c pain.        Objective   Vital signs: 120/75,70,97%    Physical Exam  Constitutional:       General: He is not in acute distress.  Eyes:      Extraocular Movements: Extraocular movements intact.   Cardiovascular:      Rate and Rhythm: Normal rate and regular rhythm.   Pulmonary:      Effort: Pulmonary effort is normal.      Breath sounds: Normal breath sounds.   Abdominal:      General: Bowel sounds are normal.      Palpations: Abdomen is soft.   Musculoskeletal:      Cervical back: Neck supple.      Right lower leg: No edema.      Left lower leg: No edema.   Neurological:      Mental Status: He is alert.   Psychiatric:         Mood and Affect: Mood normal.         Behavior: Behavior is cooperative.         Assessment/Plan   Problem List Items Addressed This Visit       Hypertension     BP at goal  Continue antihypertensives  Monitor BP           Cellulitis     Continue ATB therapy  Stop date 11/4/23  Monitor symptoms         Weakness     Continue therapy          Medications, treatments, and labs reviewed  Continue medications and treatments as listed in EMR      Scribe Attestation  I, Mariana Schaffer   attest that this documentation has been prepared under the direction and in the presence of Rita Valencia MD.     Provider Attestation - Scribe documentation  All medical record entries made by the Scribe were at my direction and personally dictated by me. I have reviewed the chart and agree that the record accurately reflects my personal performance of the history, physical exam, discussion and  plan.   Rita Valencia MD

## 2023-11-06 NOTE — LETTER
Patient: Servando Mercado  : 1940    Encounter Date: 2023    This encounter was created in error - please disregard.      Electronically Signed By: Rita Valencia MD   23  2:07 PM

## 2023-11-06 NOTE — PROGRESS NOTES
PROGRESS NOTE    Subjective   Chief complaint: Servando Mercado is a 83 y.o. male who is a long term care patient being seen and evaluated for cellulitis    HPI:  HPI nurse notified me patient on antibiotic for left lower extremity cellulitis patient has healing wound and erythema to left lower extremity and is on Keflex  Objective   Vital signs: 128/54, 96.8, 75, 96%    Physical Exam  Constitutional:       General: He is not in acute distress.  Eyes:      Extraocular Movements: Extraocular movements intact.   Pulmonary:      Effort: Pulmonary effort is normal.   Musculoskeletal:      Cervical back: Neck supple.   Neurological:      Mental Status: He is alert.   Psychiatric:         Mood and Affect: Mood normal.         Behavior: Behavior is cooperative.         Assessment/Plan   Problem List Items Addressed This Visit       Cellulitis - Primary     Continue Keflex          Medications, treatments, and labs reviewed  Continue medications and treatments as listed in [facility]    Scribe Attestation  I, Mariana Del Rio   attest that this documentation has been prepared under the direction and in the presence of JOANNA Louis.    Provider Attestation - Scribe documentation  All medical record entries made by the Scribe were at my direction and personally dictated by me. I have reviewed the chart and agree that the record accurately reflects my personal performance of the history, physical exam, discussion and plan.    JOANNA Louis

## 2023-11-13 PROBLEM — K21.9 GASTROESOPHAGEAL REFLUX DISEASE WITHOUT ESOPHAGITIS: Status: ACTIVE | Noted: 2023-01-01
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
## 2023-11-14 NOTE — LETTER
Patient: Servando Mercado  : 1940    Encounter Date: 2023    PROGRESS NOTE    Subjective  Chief complaint: Servando Mercado is a 83 y.o. male who is a long term care patient being seen and evaluated for multiple concerns.     HPI:  Patient seen and examined at bedside.  Nursing reported that patient had fall. Patient has multiple abrasions to right hand, right leg, face and nose. Nursing cleansed areas and treatments in place. Otherwise patient sustained no injuries. Patient continues on IV ATB therapy for UTI without adverse reactions. No other concerns reported.       Objective  Vital signs: 136/78,70,96%    Physical Exam  Constitutional:       General: He is not in acute distress.  Eyes:      Extraocular Movements: Extraocular movements intact.   Cardiovascular:      Rate and Rhythm: Normal rate and regular rhythm.   Pulmonary:      Effort: Pulmonary effort is normal.      Breath sounds: Normal breath sounds.   Abdominal:      General: Bowel sounds are normal.      Palpations: Abdomen is soft.   Musculoskeletal:      Cervical back: Neck supple.      Right lower leg: No edema.      Left lower leg: No edema.   Neurological:      Mental Status: He is alert.   Psychiatric:         Mood and Affect: Mood normal.         Behavior: Behavior is cooperative.         Assessment/Plan  Problem List Items Addressed This Visit       Hypertension     BP at goal  Continue antihypertensives  Monitor BP           UTI (urinary tract infection)     Continue IV ATB therapy through 11/15  Monitor symptoms         Fall     Monitor for new c\o pain\discomfort            Medications, treatments, and labs reviewed  Continue medications and treatments as listed in EMR      Scribe Attestation  I, Mariana Schaffer   attest that this documentation has been prepared under the direction and in the presence of Rita Valencia MD.     Provider Attestation - Scribe documentation  All medical record entries made by the Scribe  were at my direction and personally dictated by me. I have reviewed the chart and agree that the record accurately reflects my personal performance of the history, physical exam, discussion and plan.   Rita Valencia MD      Electronically Signed By: Rita Valencia MD   11/14/23  7:38 PM

## 2023-11-14 NOTE — PROGRESS NOTES
PROGRESS NOTE    Subjective   Chief complaint: Servando Mercado is a 83 y.o. male who is a long term care patient being seen and evaluated for multiple concerns.     HPI:  Patient seen and examined at bedside.  Nursing reported that patient had fall. Patient has multiple abrasions to right hand, right leg, face and nose. Nursing cleansed areas and treatments in place. Otherwise patient sustained no injuries. Patient continues on IV ATB therapy for UTI without adverse reactions. No other concerns reported.       Objective   Vital signs: 136/78,70,96%    Physical Exam  Constitutional:       General: He is not in acute distress.  Eyes:      Extraocular Movements: Extraocular movements intact.   Cardiovascular:      Rate and Rhythm: Normal rate and regular rhythm.   Pulmonary:      Effort: Pulmonary effort is normal.      Breath sounds: Normal breath sounds.   Abdominal:      General: Bowel sounds are normal.      Palpations: Abdomen is soft.   Musculoskeletal:      Cervical back: Neck supple.      Right lower leg: No edema.      Left lower leg: No edema.   Neurological:      Mental Status: He is alert.   Psychiatric:         Mood and Affect: Mood normal.         Behavior: Behavior is cooperative.         Assessment/Plan   Problem List Items Addressed This Visit       Hypertension     BP at goal  Continue antihypertensives  Monitor BP           UTI (urinary tract infection)     Continue IV ATB therapy through 11/15  Monitor symptoms         Fall     Monitor for new c\o pain\discomfort            Medications, treatments, and labs reviewed  Continue medications and treatments as listed in EMR      Scribe Attestation  Eri NUNEZ Scribe   attest that this documentation has been prepared under the direction and in the presence of Rita Valencia MD.     Provider Attestation - Scribe documentation  All medical record entries made by the Scribe were at my direction and personally dictated by me. I have reviewed the  chart and agree that the record accurately reflects my personal performance of the history, physical exam, discussion and plan.   Rita Valencia MD

## 2023-11-20 NOTE — PROGRESS NOTES
PROGRESS NOTE    Subjective   Chief complaint: Servando Mercado is a 83 y.o. male who is a long term care patient being seen and evaluated for monthly general medical care and follow-up.    HPI:  Patient presents for general medical care and f/u.  Patient seen and examined at bedside.  No issues per nursing.  Patient has no acute complaints. Patient with Dx of HTN, denies chest pain and headache.  Patient with Hx of gout, no recent flairs.  Patient with PVD, denies changes in the skin or decreased temperature. No acute distress. No new concerns today.  Denies n/v/f/c pain.        Objective   Vital signs: 129/63,104,95%    Physical Exam  Constitutional:       General: He is not in acute distress.  Eyes:      Extraocular Movements: Extraocular movements intact.   Cardiovascular:      Rate and Rhythm: Normal rate and regular rhythm.   Pulmonary:      Effort: Pulmonary effort is normal.      Breath sounds: Normal breath sounds.   Abdominal:      General: Bowel sounds are normal.      Palpations: Abdomen is soft.   Musculoskeletal:      Cervical back: Neck supple.      Right lower leg: No edema.      Left lower leg: No edema.   Neurological:      Mental Status: He is alert.   Psychiatric:         Mood and Affect: Mood normal.         Behavior: Behavior is cooperative.         Assessment/Plan   Problem List Items Addressed This Visit       Hypertension     BP at goal  Continue antihypertensives  Monitor BP           Gout     Stable  Continue allopurinol  Monitor for symptoms         PVD (peripheral vascular disease) (CMS/[facility])     Stable  Continue aspirin & plavix          Medications, treatments, and labs reviewed  Continue medications and treatments as listed in EMR      Scribe Attestation  IEri Scribe   attest that this documentation has been prepared under the direction and in the presence of Rita Valencia MD.     Provider Attestation - Scribe documentation  All medical record entries made by the  Scribe were at my direction and personally dictated by me. I have reviewed the chart and agree that the record accurately reflects my personal performance of the history, physical exam, discussion and plan.   Rita Vlaencia MD

## 2023-11-20 NOTE — LETTER
Patient: Servando Mercado  : 1940    Encounter Date: 2023    PROGRESS NOTE    Subjective  Chief complaint: Servando Mercado is a 83 y.o. male who is a long term care patient being seen and evaluated for monthly general medical care and follow-up.    HPI:  Patient presents for general medical care and f/u.  Patient seen and examined at bedside.  No issues per nursing.  Patient has no acute complaints. Patient with Dx of HTN, denies chest pain and headache.  Patient with Hx of gout, no recent flairs.  Patient with PVD, denies changes in the skin or decreased temperature. No acute distress. No new concerns today.  Denies n/v/f/c pain.        Objective  Vital signs: 129/63,104,95%    Physical Exam  Constitutional:       General: He is not in acute distress.  Eyes:      Extraocular Movements: Extraocular movements intact.   Cardiovascular:      Rate and Rhythm: Normal rate and regular rhythm.   Pulmonary:      Effort: Pulmonary effort is normal.      Breath sounds: Normal breath sounds.   Abdominal:      General: Bowel sounds are normal.      Palpations: Abdomen is soft.   Musculoskeletal:      Cervical back: Neck supple.      Right lower leg: No edema.      Left lower leg: No edema.   Neurological:      Mental Status: He is alert.   Psychiatric:         Mood and Affect: Mood normal.         Behavior: Behavior is cooperative.         Assessment/Plan  Problem List Items Addressed This Visit       Hypertension     BP at goal  Continue antihypertensives  Monitor BP           Gout     Stable  Continue allopurinol  Monitor for symptoms         PVD (peripheral vascular disease) (CMS/[facility])     Stable  Continue aspirin & plavix          Medications, treatments, and labs reviewed  Continue medications and treatments as listed in EMR      Scribe Attestation  I, Mariana Schaffer   attest that this documentation has been prepared under the direction and in the presence of Rita Valencia MD.     Provider  Attestation - Scribe documentation  All medical record entries made by the Scribe were at my direction and personally dictated by me. I have reviewed the chart and agree that the record accurately reflects my personal performance of the history, physical exam, discussion and plan.   Rita Valencia MD      Electronically Signed By: Rita Valencia MD   11/20/23  6:39 PM

## 2023-11-22 NOTE — LETTER
Patient: Servando Mercado  : 1940    Encounter Date: 2023    PROGRESS NOTE    Subjective  Chief complaint: Servando Mercado is a 83 y.o. male who is a long term care patient being seen and evaluated for follow up.     HPI:  HPI patient having general decline currently on IM Rocephin recent chest x-ray negative urine sample sent out patient continues to have stable vitals but weak, encouraging fluids and nutritional intake   Objective  Vital signs: 129/63, 97.8, 104, 95%    Physical Exam  Constitutional:       General: He is not in acute distress.  Eyes:      Extraocular Movements: Extraocular movements intact.   Pulmonary:      Effort: Pulmonary effort is normal.   Musculoskeletal:      Cervical back: Neck supple.   Neurological:      Mental Status: He is alert.   Psychiatric:         Mood and Affect: Mood normal.         Behavior: Behavior is cooperative.         Assessment/Plan  Problem List Items Addressed This Visit       Weakness - Primary     Continue to ask for assistance with ADLs          Medications, treatments, and labs reviewed  Continue medications and treatments as listed in [facility]    Scribe Attestation  IAnastasiya Scribe   attest that this documentation has been prepared under the direction and in the presence of JOANNA Louis.    Provider Attestation - Scribe documentation  All medical record entries made by the Scribe were at my direction and personally dictated by me. I have reviewed the chart and agree that the record accurately reflects my personal performance of the history, physical exam, discussion and plan.    JOANNA Louis          Electronically Signed By: JOANNA Louis   23 10:16 AM

## 2023-11-28 NOTE — LETTER
Patient: Servando Mercado  : 1940    Encounter Date: 2023    PROGRESS NOTE    Subjective  Chief complaint: Servando Mercado is a 83 y.o. male who is a long term care patient being seen and evaluated for leucocytosis.     HPI:  Reviewed patient lab results who has elevated WBC and change in mental status. ATB therapy initiated yesterday for possible UTI.  No adverse reactions noted.       Objective  Vital signs: 120/54,92,90%    Physical Exam  Constitutional:       General: He is not in acute distress.  Eyes:      Extraocular Movements: Extraocular movements intact.   Cardiovascular:      Rate and Rhythm: Normal rate and regular rhythm.   Pulmonary:      Effort: Pulmonary effort is normal.      Breath sounds: Normal breath sounds.   Abdominal:      General: Bowel sounds are normal.      Palpations: Abdomen is soft.   Musculoskeletal:      Cervical back: Neck supple.      Right lower leg: No edema.      Left lower leg: No edema.   Neurological:      Mental Status: He is alert.   Psychiatric:         Mood and Affect: Mood normal.         Behavior: Behavior is cooperative.         Assessment/Plan  Problem List Items Addressed This Visit       UTI (urinary tract infection)     Continue Doxy through 23  Repeat CBC\BMP 23  Monitor          Medications, treatments, and labs reviewed  Continue medications and treatments as listed in EMR      Scribe Attestation  IEri Scribe   attest that this documentation has been prepared under the direction and in the presence of Rita Valencia MD.     Provider Attestation - Scribe documentation  All medical record entries made by the Scribe were at my direction and personally dictated by me. I have reviewed the chart and agree that the record accurately reflects my personal performance of the history, physical exam, discussion and plan.   Rita Valencia MD      Electronically Signed By: Rita Valencia MD   23  8:46 PM

## 2023-11-28 NOTE — PROGRESS NOTES
PROGRESS NOTE    Subjective   Chief complaint: Servando Mercado is a 83 y.o. male who is a long term care patient being seen and evaluated for leucocytosis.     HPI:  Reviewed patient lab results who has elevated WBC and change in mental status. ATB therapy initiated yesterday for possible UTI.  No adverse reactions noted.       Objective   Vital signs: 120/54,92,90%    Physical Exam  Constitutional:       General: He is not in acute distress.  Eyes:      Extraocular Movements: Extraocular movements intact.   Cardiovascular:      Rate and Rhythm: Normal rate and regular rhythm.   Pulmonary:      Effort: Pulmonary effort is normal.      Breath sounds: Normal breath sounds.   Abdominal:      General: Bowel sounds are normal.      Palpations: Abdomen is soft.   Musculoskeletal:      Cervical back: Neck supple.      Right lower leg: No edema.      Left lower leg: No edema.   Neurological:      Mental Status: He is alert.   Psychiatric:         Mood and Affect: Mood normal.         Behavior: Behavior is cooperative.         Assessment/Plan   Problem List Items Addressed This Visit       UTI (urinary tract infection)     Continue Doxy through 12/4/23  Repeat CBC\BMP 11/29/23  Monitor          Medications, treatments, and labs reviewed  Continue medications and treatments as listed in EMR      Scribe Attestation  Eri NUNEZ Scribe   attest that this documentation has been prepared under the direction and in the presence of Rita Valencia MD.     Provider Attestation - Scribe documentation  All medical record entries made by the Scribe were at my direction and personally dictated by me. I have reviewed the chart and agree that the record accurately reflects my personal performance of the history, physical exam, discussion and plan.   Rita Valencia MD

## 2023-11-29 NOTE — PROGRESS NOTES
PROGRESS NOTE    Subjective   Chief complaint: Servando Mercado is a 83 y.o. male who is a long term care patient being seen and evaluated for follow up.     HPI:  HPI patient having general decline currently on IM Rocephin recent chest x-ray negative urine sample sent out patient continues to have stable vitals but weak, encouraging fluids and nutritional intake   Objective   Vital signs: 129/63, 97.8, 104, 95%    Physical Exam  Constitutional:       General: He is not in acute distress.  Eyes:      Extraocular Movements: Extraocular movements intact.   Pulmonary:      Effort: Pulmonary effort is normal.   Musculoskeletal:      Cervical back: Neck supple.   Neurological:      Mental Status: He is alert.   Psychiatric:         Mood and Affect: Mood normal.         Behavior: Behavior is cooperative.         Assessment/Plan   Problem List Items Addressed This Visit       Weakness - Primary     Continue to ask for assistance with ADLs          Medications, treatments, and labs reviewed  Continue medications and treatments as listed in [facility]    Scribe Attestation  Anastasiya NUNEZ Scribe   attest that this documentation has been prepared under the direction and in the presence of JOANNA Louis.    Provider Attestation - Scribe documentation  All medical record entries made by the Scribe were at my direction and personally dictated by me. I have reviewed the chart and agree that the record accurately reflects my personal performance of the history, physical exam, discussion and plan.    JOANNA Louis

## 2023-11-30 PROBLEM — R62.7 FTT (FAILURE TO THRIVE) IN ADULT: Status: ACTIVE | Noted: 2023-01-01

## 2023-11-30 PROBLEM — R41.82 CHANGE IN MENTAL STATUS: Status: ACTIVE | Noted: 2023-01-01

## 2023-11-30 NOTE — LETTER
Patient: Servando Mercado  : 1940    Encounter Date: 2023    PROGRESS NOTE    Subjective  Chief complaint: Servando Mercado is a 83 y.o. male who is a long term care patient being seen and evaluated for UTI    HPI:  HPI  Patient seen and examined at bedside.  Patient with mental status change, leukocytosis and UTI.  Patient started on IV ATB, tolerating with no adverse effects.  Patient is not eating well, sleeping more, weak.  Patient is in no acute distress.    Objective  Vital signs: 118/77, 98.0, 70, 18, 94%    Physical Exam  Constitutional:       General: He is not in acute distress.  Eyes:      Extraocular Movements: Extraocular movements intact.   Cardiovascular:      Rate and Rhythm: Normal rate and regular rhythm.   Pulmonary:      Effort: Pulmonary effort is normal.      Breath sounds: Normal breath sounds.   Abdominal:      General: Bowel sounds are normal.      Palpations: Abdomen is soft.   Musculoskeletal:      Cervical back: Neck supple.      Right lower leg: No edema.      Left lower leg: No edema.   Neurological:      Mental Status: He is alert.   Psychiatric:         Mood and Affect: Mood normal.         Behavior: Behavior is cooperative.         Assessment/Plan  Problem List Items Addressed This Visit       Hypertension - Primary     BP at goal  Continue antihypertensives  Monitor BP           UTI (urinary tract infection)     Continue IV ATB through   Monitor         FTT (failure to thrive) in adult     Poor p.o. intake  monitor          Medications, treatments, and labs reviewed  Continue medications and treatments as listed in EMR    Scribe Attestation  Sonal NUNEZ Scribe   attest that this documentation has been prepared under the direction and in the presence of Rita Valencia MD    Provider Attestation - Scribe documentation  All medical record entries made by the Scribe were at my direction and personally dictated by me. I have reviewed the chart and agree that the  record accurately reflects my personal performance of the history, physical exam, discussion and plan.   Rita Valencia MD            Electronically Signed By: Rita Valencia MD   11/30/23  7:07 PM

## 2023-11-30 NOTE — PROGRESS NOTES
PROGRESS NOTE    Subjective   Chief complaint: Servando Mercado is a 83 y.o. male who is a long term care patient being seen and evaluated for UTI    HPI:  HPI  Patient seen and examined at bedside.  Patient with mental status change, leukocytosis and UTI.  Patient started on IV ATB, tolerating with no adverse effects.  Patient is not eating well, sleeping more, weak.  Patient is in no acute distress.    Objective   Vital signs: 118/77, 98.0, 70, 18, 94%    Physical Exam  Constitutional:       General: He is not in acute distress.  Eyes:      Extraocular Movements: Extraocular movements intact.   Cardiovascular:      Rate and Rhythm: Normal rate and regular rhythm.   Pulmonary:      Effort: Pulmonary effort is normal.      Breath sounds: Normal breath sounds.   Abdominal:      General: Bowel sounds are normal.      Palpations: Abdomen is soft.   Musculoskeletal:      Cervical back: Neck supple.      Right lower leg: No edema.      Left lower leg: No edema.   Neurological:      Mental Status: He is alert.   Psychiatric:         Mood and Affect: Mood normal.         Behavior: Behavior is cooperative.         Assessment/Plan   Problem List Items Addressed This Visit       Hypertension - Primary     BP at goal  Continue antihypertensives  Monitor BP           UTI (urinary tract infection)     Continue IV ATB through 12/7  Monitor         FTT (failure to thrive) in adult     Poor p.o. intake  monitor          Medications, treatments, and labs reviewed  Continue medications and treatments as listed in EMR    Scribe Attestation  Sonal NUNEZ Scribe   attest that this documentation has been prepared under the direction and in the presence of Rita Valencia MD    Provider Attestation - Scribe documentation  All medical record entries made by the Scribe were at my direction and personally dictated by me. I have reviewed the chart and agree that the record accurately reflects my personal performance of the history,  physical exam, discussion and plan.   Rita Valencia MD

## 2023-12-04 PROBLEM — E86.0 DEHYDRATION: Status: ACTIVE | Noted: 2023-01-01

## 2023-12-04 NOTE — ED TRIAGE NOTES
Pt BIBA from [facility] for failure to thrive, diminished PO intake 1 week, AMS- normally A&Ox4, A&Ox1 today, chronic leg pain and chronic dooley in place. Denies CP or SOB

## 2023-12-04 NOTE — PROGRESS NOTES
Pharmacy Medication History Review    Servando Mercado is a 83 y.o. male admitted for No Principal Problem: There is no principal problem currently on the Problem List. Please update the Problem List and refresh.. Pharmacy reviewed the patient's tgmul-sm-skodakcri medications and allergies for accuracy.    The list below reflectives the updated PTA list. Please review each medication in order reconciliation for additional clarification and justification.  Prior to Admission Medications   Prescriptions Last Dose Informant Patient Reported? Taking?   acetaminophen (Tylenol 8 HOUR) 650 mg ER tablet Unknown Other Yes Yes   Sig: Take 2 tablets (1,300 mg) by mouth 2 times a day. Do not crush, chew, or split.   acetaminophen (Tylenol) 325 mg tablet Unknown Other Yes Yes   Sig: Take 2 tablets (650 mg) by mouth every 4 hours if needed for mild pain (1 - 3) or fever (temp greater than 38.0 C).   ascorbic acid (Vitamin C) 500 mg tablet Unknown Other Yes Yes   Sig: Take 1 tablet (500 mg) by mouth once daily.   bisacodyl (Dulcolax, bisacodyl,) 10 mg suppository Unknown Other Yes Yes   Sig: Insert 1 suppository (10 mg) into the rectum once daily as needed for constipation.   clopidogrel (Plavix) 75 mg tablet Unknown Other Yes Yes   Sig: Take 1 tablet (75 mg) by mouth once daily.   docusate sodium (Colace) 100 mg capsule Unknown Other Yes No   Sig: Take 1 capsule (100 mg) by mouth 2 times a day.   famotidine (Pepcid) 20 mg tablet Unknown Other Yes Yes   Sig: Take 1 tablet (20 mg) by mouth once daily.   fexofenadine (Allegra) 60 mg tablet Unknown Other Yes Yes   Sig: Take 1 tablet (60 mg) by mouth once daily.   heparin sodium,porcine/PF (HEPARIN, PORCINE, LOCK FLUSH IV) Unknown Other Yes Yes   Sig: Infuse 3 mL into a venous catheter every 8 hours. For monitoring flush after med and NS Flush   lidocaine 4 % patch Unknown Other Yes Yes   Sig: Place 1 patch on the skin every 8 hours. Apply to Neck   magnesium hydroxide (Milk of  Magnesia) 400 mg/5 mL suspension Unknown Other Yes Yes   Sig: Take 30 mL by mouth as needed at bedtime for constipation.   melatonin 5 mg tablet Unknown Other Yes Yes   Sig: Take 1 tablet (5 mg) by mouth as needed at bedtime for sleep.   menthol 5 % adhesive patch,medicated Unknown Other Yes Yes   Sig: Apply 1 patch topically once daily. Apply to right shoulder   metoprolol tartrate (Lopressor) 25 mg tablet Unknown Other Yes Yes   Sig: Take 1 tablet (25 mg) by mouth 2 times a day.   piperacillin-tazobactam-dextrose (Zosyn in dextrose, iso-osm,) 2.25 gram/50 mL IV Unknown Other Yes Yes   Sig: Infuse 50 mL (2.25 g) into a venous catheter every 8 hours.   polyethylene glycol (Glycolax, Miralax) 17 gram packet Unknown Other Yes Yes   Sig: Take 17 g by mouth once daily as needed.   pseudoephedrine ER (Sudafed-12 Hour) 120 mg 12 hr tablet Unknown Other Yes Yes   Sig: Take 1 tablet (120 mg) by mouth once daily. Do not crush, chew, or split.   traMADol (Ultram) 50 mg tablet Unknown Other Yes Yes   Sig: Take 1 tablet (50 mg) by mouth every 6 hours if needed for moderate pain (4 - 6).      Facility-Administered Medications: None        The list below reflectives the updated allergy list. Please review each documented allergy for additional clarification and justification.  Allergies  Reviewed by Rocio Hendrix CPhT on 12/4/2023        Severity Reactions Comments    Amlodipine Not Specified Unknown     Benazepril Not Specified Unknown     Hydrocodone Not Specified Unknown     Losartan Not Specified Unknown     Statins-hmg-coa Reductase Inhibitors Not Specified Unknown     Tetracyclic Antidepressants Not Specified Unknown     Tetracycline Not Specified Unknown             Below are additional concerns with the patient's PTA list.    Medication list from the [facility], printed 12/04/2023 07:55.    Rocio Hendrix CPhT

## 2023-12-04 NOTE — H&P
History Of Present Illness  Servando Mercado is a 83 y.o. male with past medical history significant for CKD, HTN, PVD, GERD, and gout who presents from [facility] for failure to thrive and altered mental status.  According to the nursing notes he has not had any food or water for the past week and a half.  Says that patient is normally AO x 4, however today he is AO x 1.  Patient has a chronic Mccoy.  Mccoy catheter has blood present in the bag today.  Recent UTI on 11/30, patient was placed on IV antibiotics until 12/7 per Dr. Valencia.  Patient was sleeping when seen today.  Was able to open his eyes and wake up with verbal stimuli, however did not provide any information or respond much.  Patient seems very lethargic.  Unable to answer questions or state if he is in pain/discomfort.    ER course: On arrival, patient was afebrile, tachycardic with heart rate 113, otherwise hemodynamically stable with a SpO2 94% on room air.  Glucose 100, chloride 113, bicarbonate 19, BUN 63/creatinine 2.49, albumin 2.6, total protein 6.1.  Lactate 1.1.  Troponin at 1051 was 80.  Repeat at 1316 was 60.  WBC 8.4 with slight left shift.  Hemoglobin 9.8/hematocrit 31.7 (chronic anemia).  Coronavirus not detected.  VBG at 1051 shows pH 7.39, pCO2 34, pO2 27, bicarb 20.6.  Chest x-ray, and CT of the head/cervical spine results noted below.  Urinalysis shows red and turbid color with 2+ protein, 3+ blood, and some leukocyte esterase.    EKG (interpreted by ED physician): Paced rhythm at a rate of 120 with occasional PVCs present.    Admitting provider is Dr. 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     Social History  He has no history on file for tobacco use, alcohol use, and drug use.    Family History  No family history on file.     Allergies  Amlodipine, Benazepril, Hydrocodone, Losartan, Statins-hmg-coa reductase inhibitors, Tetracyclic antidepressants, and Tetracycline    Review of Systems  Limited ROS due to patient's current status    Physical Exam  Constitutional:       General: He is not in acute distress.     Appearance: He is normal weight.      Comments: Lethargic.  Unable to fully assess.  Opens eyes with verbal stimuli but does not answer questions.   HENT:      Head: Normocephalic and atraumatic.      Mouth/Throat:      Mouth: Mucous membranes are dry.   Eyes:      Extraocular Movements: Extraocular movements intact.      Conjunctiva/sclera: Conjunctivae normal.      Pupils: Pupils are equal, round, and reactive to light.   Cardiovascular:      Rate and Rhythm: Regular rhythm. Tachycardia present.      Pulses: Normal pulses.   Abdominal:      General: Bowel sounds are normal.      Palpations: Abdomen is soft.      Tenderness: There is no abdominal tenderness.   Musculoskeletal:         General: No swelling.      Right lower leg: No edema.      Left lower leg: No edema.   Skin:     General: Skin is warm and dry.   Neurological:      General: No focal deficit present.   Psychiatric:         Mood and Affect: Mood normal.         Behavior: Behavior normal.       Last Recorded Vitals  Blood pressure 166/79, pulse (!) 115, temperature 37.2 °C (99 °F), resp. rate 19, SpO2 99 %.    Relevant Results  Results for orders placed or performed during the hospital encounter of 12/04/23 (from the past 24 hour(s))   Sars-CoV-2 PCR, Symptomatic   Result Value Ref Range    Coronavirus 2019, PCR Not Detected Not Detected   ECG 12 lead   Result Value Ref Range    Ventricular Rate 120 BPM    Atrial Rate  119 BPM    QRS Duration 172 ms    QT Interval 408 ms    QTC Calculation(Bazett) 576 ms    R Axis -79 degrees    T Axis 90 degrees    QRS Count 20 beats    Q Onset 188 ms    T Offset 392 ms    QTC Fredericia 514 ms   CBC and Auto Differential   Result Value Ref Range    WBC 8.4 4.4 - 11.3 x10*3/uL    nRBC 0.0 0.0 - 0.0 /100 WBCs    RBC 3.65 (L) 4.50 - 5.90 x10*6/uL    Hemoglobin 9.8 (L) 13.5 - 17.5 g/dL    Hematocrit 31.7 (L) 41.0 - 52.0 %    MCV 87 80 - 100 fL    MCH 26.8 26.0 - 34.0 pg    MCHC 30.9 (L) 32.0 - 36.0 g/dL    RDW 16.1 (H) 11.5 - 14.5 %    Platelets 287 150 - 450 x10*3/uL    Neutrophils % 73.5 40.0 - 80.0 %    Immature Granulocytes %, Automated 1.1 (H) 0.0 - 0.9 %    Lymphocytes % 13.5 13.0 - 44.0 %    Monocytes % 8.7 2.0 - 10.0 %    Eosinophils % 2.5 0.0 - 6.0 %    Basophils % 0.7 0.0 - 2.0 %    Neutrophils Absolute 6.15 (H) 1.60 - 5.50 x10*3/uL    Immature Granulocytes Absolute, Automated 0.09 0.00 - 0.50 x10*3/uL    Lymphocytes Absolute 1.13 0.80 - 3.00 x10*3/uL    Monocytes Absolute 0.73 0.05 - 0.80 x10*3/uL    Eosinophils Absolute 0.21 0.00 - 0.40 x10*3/uL    Basophils Absolute 0.06 0.00 - 0.10 x10*3/uL   Comprehensive metabolic panel   Result Value Ref Range    Glucose 100 (H) 74 - 99 mg/dL    Sodium 143 136 - 145 mmol/L    Potassium 3.9 3.5 - 5.3 mmol/L    Chloride 113 (H) 98 - 107 mmol/L    Bicarbonate 19 (L) 21 - 32 mmol/L    Anion Gap 15 10 - 20 mmol/L    Urea Nitrogen 63 (H) 6 - 23 mg/dL    Creatinine 2.49 (H) 0.50 - 1.30 mg/dL    eGFR 25 (L) >60 mL/min/1.73m*2    Calcium 9.4 8.6 - 10.3 mg/dL    Albumin 2.6 (L) 3.4 - 5.0 g/dL    Alkaline Phosphatase 73 33 - 136 U/L    Total Protein 6.1 (L) 6.4 - 8.2 g/dL    AST 25 9 - 39 U/L    Bilirubin, Total 0.5 0.0 - 1.2 mg/dL    ALT 14 10 - 52 U/L   Lipase   Result Value Ref Range    Lipase 36 9 - 82 U/L   Lactate   Result Value Ref Range    Lactate 1.1 0.4 - 2.0 mmol/L   Blood Gas Venous   Result Value Ref Range    POCT pH, Venous 7.39 7.33 - 7.43 pH     POCT pCO2, Venous 34 (L) 41 - 51 mm Hg    POCT pO2, Venous 27 (L) 35 - 45 mm Hg    POCT SO2, Venous 49 45 - 75 %    POCT Oxy Hemoglobin, Venous 47.5 45.0 - 75.0 %    POCT Base Excess, Venous -3.6 (L) -2.0 - 3.0 mmol/L    POCT HCO3 Calculated, Venous 20.6 (L) 22.0 - 26.0 mmol/L    Patient Temperature 37.0 degrees Celsius    FiO2 21 %    Test Comment ICU-S    Troponin I, High Sensitivity   Result Value Ref Range    Troponin I, High Sensitivity 80 (HH) 0 - 20 ng/L   Urinalysis with Reflex Microscopic and Culture   Result Value Ref Range    Color, Urine Red (N) Straw, Yellow    Appearance, Urine Turbid (N) Clear    Specific Gravity, Urine 1.020 1.005 - 1.035    pH, Urine 6.0 5.0, 5.5, 6.0, 6.5, 7.0, 7.5, 8.0    Protein, Urine 100 (2+) (A) NEGATIVE, 10 (TRACE) mg/dL    Glucose, Urine NEGATIVE NEGATIVE mg/dL    Blood, Urine 1.0 (3+) (A) NEGATIVE    Ketones, Urine NEGATIVE NEGATIVE mg/dL    Bilirubin, Urine NEGATIVE NEGATIVE    Urobilinogen, Urine NORM NORM mg/dL    Nitrite, Urine NEGATIVE NEGATIVE    Leukocyte Esterase, Urine 75 Nidia/µL (A) NEGATIVE   Microscopic Only, Urine   Result Value Ref Range    WBC, Urine NONE 1-5, NONE /HPF    RBC, Urine >20 (A) NONE, 1-2, 3-5 /HPF   Troponin I, High Sensitivity   Result Value Ref Range    Troponin I, High Sensitivity 60 (HH) 0 - 20 ng/L      CT cervical spine wo IV contrast    Result Date: 12/4/2023  Interpreted By:  Rohan Bynum, STUDY: CT CERVICAL SPINE WO IV CONTRAST;  12/4/2023 11:54 am   INDICATION: Signs/Symptoms:altered mental status.   COMPARISON: None.   ACCESSION NUMBER(S): [id number]   ORDERING CLINICIAN: TERRY YBARRA   TECHNIQUE: Contiguous axial CT images were obtained at  2 mm slice thickness through the cervical spine without contrast administration. The images were then reconstructed in the coronal and sagittal planes.   FINDINGS: The patient is status post C3-4, C4-5 and C5-6 interbody fusion, with an anterior plate and screw fixation device extending from  C3 through C6. There is no CT evidence of acute fracture identified. No prevertebral soft tissue swelling is identified.   ALIGNMENT: There is mild anterolisthesis of C7 on T1 and minimal anterolisthesis of C2 on C3.   VERTEBRAE/DISC SPACES: Severe disc space narrowing and bulky, bridging, osteophyte formation is seen at the C6-7 level. Mild disc space narrowing and bridging osteophyte formation is seen at the C2-3 and C7-T1 levels. Mild-to-moderate facet degenerative changes are seen throughout the cervical spine.   C2-3: Or moderate left-sided neural foraminal narrowing is seen. No significant right foraminal or central canal narrowing is seen.   C3-4: Severe neural foraminal narrowing is seen. At least moderate central canal narrowing is seen.   C4-5: Severe neural foraminal narrowing is seen bilaterally. At least moderate to severe central canal narrowing is seen.   C5-6: Severe neural foraminal narrowing is seen bilaterally. At least moderate central canal narrowing is seen.   C6-7: Severe right-sided and moderate to severe left-sided neural foraminal narrowing is seen. At least moderate to severe central canal narrowing is seen.   C7-T1: Moderate right-sided and moderate to severe left-sided neural foraminal narrowing is seen. At least mild central canal narrowing is seen.   ADDITIONAL FINDINGS: Evaluation of the visualized soft tissues of the neck is limited by the lack of intravenous contrast.  Within this limitation, no gross mass or lymphadenopathy is identified.       1.  No CT evidence of acute fracture. 2.  Postoperative and degenerative changes throughout the cervical spine, as above.   Signed by: Rohan Bynum 12/4/2023 12:06 PM Dictation workstation:   [id number]    CT head wo IV contrast    Result Date: 12/4/2023  Interpreted By:  Sebastian Terrazas, STUDY: CT HEAD WO IV CONTRAST;  12/4/2023 10:31 am   INDICATION: Signs/Symptoms:Altered mental status.   COMPARISON: None.   ACCESSION NUMBER(S):  [id number]   ORDERING CLINICIAN: TERRY YBARRA   TECHNIQUE: Noncontrast axial CT scan of head was performed. Motion artifact mildly degrades assessment.   FINDINGS: Parenchyma: There is no intracranial hemorrhage. The grey-white differentiation is intact. There is no mass effect or midline shift. Moderate chronic small vessel ischemic disease. Remote appearing lacunar infarcts involving the anterior subinsular regions bilaterally. Atherosclerotic calcification of the carotid siphons bilaterally. Small nonspecific calcification within a right parietal sulcus (series 200, image 20).   CSF Spaces: Moderate generalized brain atrophy with disproportionate prominence of the ventricular collecting system, favor advanced central atrophy.   Extra-Axial Fluid: There is no extraaxial fluid collection.   Calvarium: The calvarium is unremarkable.   Paranasal sinuses: Visualized paranasal sinuses are clear.   Mastoids: Clear.   Orbits: Normal.   Soft tissues: Unremarkable.   Other: Partially visualized on this examination is a C1 Andres type burst fracture with 3 dominant ossific fragments. There is an element of spinal stenosis with the thecal sac measuring approximately 8 mm in anterior-posterior dimension (series 3, image 1).       Partially visualized C1 fracture; recommend dedicated CT cervical spine for further evaluation/characterization. Element of spinal stenosis evident.   No acute intracranial hemorrhage, mass effect, or CT apparent acute infarct.   Moderate chronic small vessel ischemic disease.   Moderate brain atrophy with disproportionate prominence of the ventricular collecting system, favor a component of advanced central atrophy.   MACRO: Sebastian Terrazas discussed the significance and urgency of this critical finding by telephone with  TERRY YBARRA on 12/4/2023 at 10:48 am. (**-RCF-**) Findings:  See findings.     Signed by: Sebastian Terrazas 12/4/2023 10:50 AM Dictation workstation:   [id number]    ECG 12  lead    Result Date: 12/4/2023  Ventricular-paced rhythm with occasional Premature ventricular complexes Abnormal ECG No previous ECGs available    XR chest 1 view    Result Date: 12/4/2023  Interpreted By:  Sebastian Terrazas, STUDY: XR CHEST 1 VIEW;  12/4/2023 9:58 am   INDICATION: Signs/Symptoms:altered mental status.   COMPARISON: None.   ACCESSION NUMBER(S): [id number]   ORDERING CLINICIAN: TERRY YBARRA   FINDINGS: AP radiograph of the chest was provided.   DEVICES: Left-sided approach dual lead cardiac pacemaker.   CARDIOMEDIASTINAL SILHOUETTE: Cardiac silhouette appears enlarged which could be exaggerated by patient rotation. Mediastinal contours are grossly within normal limits with aortic atherosclerotic calcification evident.   LUNGS: Questionable ill-defined right perihilar parenchymal opacity. No discrete consolidative parenchymal airspace opacity appreciated. No pneumothorax. No large pleural effusion.   ABDOMEN: Questionable mild colonic gaseous distention of the bowel loops within the upper abdomen.   BONES: ACDF partially visualized. No acute osseous changes.       Examination limited by portable technique and patient rotation. Cardiomegaly with possible mild interstitial edema/vascular congestion. No gross consolidative opacity or large pleural effusion evident.   MACRO: None   Signed by: Sebastian Terrazas 12/4/2023 10:39 AM Dictation workstation:   [id number]       Assessment/Plan   Principal Problem:    Dehydration    Servando Mercado is a 83 y.o. male who presents from [facility] for failure to thrive and altered mental status.  According to the nursing notes he has not had any food or water for the past week and a half.  Says that patient is normally AO x 4, however today he is AO x 1.  Patient has a chronic Mccoy.  Mccoy catheter has blood present in the bag today.  Recent UTI on 11/30, patient was placed on IV antibiotics until 12/7 per Dr. Valencia.    #Failure to  thrive  #Dehydration  #RIOS on CKD  #Recent UTI  Admit for observation and telemetry monitoring  Urine culture pending  Continue Zosyn  Normal saline  Swallow evaluation  N.p.o. diet until more alert  BMP, CBC in a.m.  PT/OT for evaluation and treatment  Social work consult for discharge planning  Q 4 vitals    #Blood in Mccoy catheter bag  Hold Plavix and anticoagulation until urine returns to normal    #Elevated troponin  Troponin at 1051 was 80, Repeat at 1316 was 60.  EKG reviewed    Continue at home medications as appropriate    Chronic conditions:  #HTN  #PVD  #GERD  #Gout  #CKD    #DVT prophylaxis  SCDs, ambulation as tolerated  Hold anticoagulation due to current bleeding in Mccoy catheter       I spent 25 minutes in the professional and overall care of this patient.    Mac Bradley PA-C

## 2023-12-04 NOTE — Clinical Note
PEG tube connected to dooley drainage bag per Dr Monzon. Sterile gauze and abdominal dressing placed over 0PEG tube site with abdominal binder then secured over site.

## 2023-12-04 NOTE — ED PROVIDER NOTES
HPI   Chief Complaint   Patient presents with    Failure To Thrive       83-year-old male who presents from the [facility] for failure to thrive and altered mental status.  According to the nursing notes she has not had any food or water for the past week and a half.  His BUN and creatinine were elevated.  Patient is alert and oriented x 1.  Patient is able to tell me his name.  He does not respond when you ask him if he has any pain.                          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  No family history on file.  Social History     Tobacco Use    Smoking status: Not on file    Smokeless tobacco: Not on file   Substance Use Topics    Alcohol use: Not on file    Drug use: Not on file       Physical Exam   ED Triage Vitals [12/04/23 0946]   Temp Heart Rate Resp BP   37.2 °C (99 °F) (!) 113 18 106/65      SpO2 Temp src Heart Rate Source Patient Position   94 % -- -- --      BP Location FiO2 (%)     -- --       Physical Exam  Constitutional:       Appearance: Normal appearance. He is normal weight.   HENT:      Head: Normocephalic and atraumatic.      Nose: Nose normal.      Mouth/Throat:      Mouth: Mucous membranes are moist.      Pharynx: Oropharynx is clear.   Eyes:      Extraocular Movements: Extraocular movements intact.      Conjunctiva/sclera: Conjunctivae normal.      Pupils: Pupils are equal, round, and reactive to light.   Cardiovascular:      Rate and Rhythm: Regular rhythm. Tachycardia present.   Pulmonary:      Effort: Pulmonary  effort is normal.      Breath sounds: Normal breath sounds.   Abdominal:      General: Abdomen is flat. Bowel sounds are normal.      Palpations: Abdomen is soft.   Musculoskeletal:         General: Normal range of motion.      Cervical back: Normal range of motion and neck supple.   Skin:     General: Skin is warm and dry.      Capillary Refill: Capillary refill takes less than 2 seconds.   Neurological:      General: No focal deficit present.      Mental Status: He is alert.   Psychiatric:         Mood and Affect: Mood normal.         Behavior: Behavior normal.         Thought Content: Thought content normal.         Judgment: Judgment normal.       Labs Reviewed   CBC WITH AUTO DIFFERENTIAL - Abnormal       Result Value    WBC 8.4      nRBC 0.0      RBC 3.65 (*)     Hemoglobin 9.8 (*)     Hematocrit 31.7 (*)     MCV 87      MCH 26.8      MCHC 30.9 (*)     RDW 16.1 (*)     Platelets 287      Neutrophils % 73.5      Immature Granulocytes %, Automated 1.1 (*)     Lymphocytes % 13.5      Monocytes % 8.7      Eosinophils % 2.5      Basophils % 0.7      Neutrophils Absolute 6.15 (*)     Immature Granulocytes Absolute, Automated 0.09      Lymphocytes Absolute 1.13      Monocytes Absolute 0.73      Eosinophils Absolute 0.21      Basophils Absolute 0.06     COMPREHENSIVE METABOLIC PANEL - Abnormal    Glucose 100 (*)     Sodium 143      Potassium 3.9      Chloride 113 (*)     Bicarbonate 19 (*)     Anion Gap 15      Urea Nitrogen 63 (*)     Creatinine 2.49 (*)     eGFR 25 (*)     Calcium 9.4      Albumin 2.6 (*)     Alkaline Phosphatase 73      Total Protein 6.1 (*)     AST 25      Bilirubin, Total 0.5      ALT 14     BLOOD GAS VENOUS - Abnormal    POCT pH, Venous 7.39      POCT pCO2, Venous 34 (*)     POCT pO2, Venous 27 (*)     POCT SO2, Venous 49      POCT Oxy Hemoglobin, Venous 47.5      POCT Base Excess, Venous -3.6 (*)     POCT HCO3 Calculated, Venous 20.6 (*)     Patient Temperature 37.0      FiO2 21      Test  Comment ICU-S     TROPONIN I, HIGH SENSITIVITY - Abnormal    Troponin I, High Sensitivity 80 (*)     Narrative:     Less than 99th percentile of normal range cutoff-  Female and children under 18 years old <14 ng/L; Male <21 ng/L: Negative  Repeat testing should be performed if clinically indicated.     Female and children under 18 years old 14-50 ng/L; Male 21-50 ng/L:  Consistent with possible cardiac damage and possible increased clinical   risk. Serial measurements may help to assess extent of myocardial damage.     >50 ng/L: Consistent with cardiac damage, increased clinical risk and  myocardial infarction. Serial measurements may help assess extent of   myocardial damage.      NOTE: Children less than 1 year old may have higher baseline troponin   levels and results should be interpreted in conjunction with the overall   clinical context.     NOTE: Troponin I testing is performed using a different   testing methodology at [facility] than at other   [facility]. Direct result comparisons should only   be made within the same method.   LIPASE - Normal    Lipase 36      Narrative:     Venipuncture immediately after or during the administration of Metamizole may lead to falsely low results. Testing should be performed immediately prior to Metamizole dosing.   LACTATE - Normal    Lactate 1.1      Narrative:     Venipuncture immediately after or during the administration of Metamizole may lead to falsely low results. Testing should be performed immediately  prior to Metamizole dosing.   SARS-COV-2 PCR, SYMPTOMATIC - Normal    Coronavirus 2019, PCR Not Detected      Narrative:     This assay has received FDA Emergency Use Authorization (EUA) and is only authorized for the duration of time that circumstances exist to justify the authorization of the emergency use of in vitro diagnostic tests for the detection of SARS-CoV-2 virus and/or diagnosis of COVID-19 infection under section 564(b)(1) of the  Act, 21 U.S.C. 360bbb-3(b)(1). This assay is an in vitro diagnostic nucleic acid amplification test for the qualitative detection of SARS-CoV-2 from nasopharyngeal specimens and has been validated for use at [facility]. Negative results do not preclude COVID-19 infections and should not be used as the sole basis for diagnosis, treatment, or other management decisions.     URINALYSIS WITH REFLEX MICROSCOPIC AND CULTURE    Narrative:     The following orders were created for panel order Urinalysis with Reflex Microscopic and Culture.  Procedure                               Abnormality         Status                     ---------                               -----------         ------                     Urinalysis with Reflex M...[[id number]]                                                 Extra Urine Gray Tube[[id number]]                                                         Please view results for these tests on the individual orders.   URINALYSIS WITH REFLEX MICROSCOPIC AND CULTURE   EXTRA URINE GRAY TUBE   TROPONIN I, HIGH SENSITIVITY       CT cervical spine wo IV contrast   Final Result   1.  No CT evidence of acute fracture.   2.  Postoperative and degenerative changes throughout the cervical   spine, as above.        Signed by: Rohan Bynum 12/4/2023 12:06 PM   Dictation workstation:   [id number]      CT head wo IV contrast   Final Result   Partially visualized C1 fracture; recommend dedicated CT cervical   spine for further evaluation/characterization. Element of spinal   stenosis evident.        No acute intracranial hemorrhage, mass effect, or CT apparent acute   infarct.        Moderate chronic small vessel ischemic disease.        Moderate brain atrophy with disproportionate prominence of the   ventricular collecting system, favor a component of advanced central   atrophy.        MACRO:   Sebastian Terrazas discussed the significance and urgency of this critical   finding by  telephone with  TERRY YBARRA on 12/4/2023 at 10:48 am.   (**-RCF-**) Findings:  See findings.             Signed by: Sebastian Terrazas 12/4/2023 10:50 AM   Dictation workstation:   [id number]      XR chest 1 view   Final Result   Examination limited by portable technique and patient rotation.   Cardiomegaly with possible mild interstitial edema/vascular   congestion. No gross consolidative opacity or large pleural effusion   evident.        MACRO:   None        Signed by: Sebastian Terrazas 12/4/2023 10:39 AM   Dictation workstation:   [id number]            ED Course & MDM   Diagnoses as of 12/04/23 1241   Dehydration   Acute kidney injury (CMS/HCC)   Failure to thrive in adult   Elevated troponin       Medical Decision Making  Emergency department course, laboratory studies were obtained and reviewed patient does have chronic kidney disease with a BUN of 63 and a creatinine of 2.47.  Venous pH is 7.39.  CT of the head just shows chronic changes.  CT of the cervical spine shows no acute fractures.  Chest x-ray shows some cardiomegaly with rotation.  We did adjust his Mccoy catheter and did have some blood come out of the Mccoy but is draining.  Patient is receiving IV fluids.  Will discuss with Kenneth Gr for admission.    Amount and/or Complexity of Data Reviewed  ECG/medicine tests: independent interpretation performed.     Details: EKG shows a paced rhythm at a rate of 120 with occasional PVCs present.        Procedure  Procedures     Terry Ybarra,   12/04/23 1241

## 2023-12-05 NOTE — PROGRESS NOTES
Physical Therapy SCREEN                 Therapy Communication Note    Patient Name: Servando Mercado  MRN: [id number]  Today's Date: 12/5/2023     Discipline: Physical Therapy    Missed Visit Reason: Missed Visit Reason:  (Eval attempted. Pt supine in bed, oriented to self. Garbled speech, unable to answer questions. Pt with BLE contractures, unable to extended BLE when attempted. Indicates pt may not be ambulatory at baseline. Pt admitted from facility.)    Missed Time: Attempt    Comment: At this time, pt is not appropriate for PT. Will DC PT orders.

## 2023-12-05 NOTE — H&P
History Of Present Illness  Servando Mercado is a 83 y.o. male with past medical history significant for CKD, HTN, PVD, GERD, and gout who presents from [facility] for failure to thrive and altered mental status.  According to the nursing notes he has not had any food or water for the past week and a half.  Says that patient is normally AO x 4, however today he is AO x 1.  Patient has a chronic Mccoy.  Mccoy catheter has blood present in the bag today.  Recent UTI on 11/30, patient was placed on IV antibiotics until 12/7 per Dr. Valencia.  Patient was sleeping when seen today.  Was able to open his eyes and wake up with verbal stimuli, however did not provide any information or respond much.  Patient seems very lethargic.  Unable to answer questions or state if he is in pain/discomfort.    ER course: On arrival, patient was afebrile, tachycardic with heart rate 113, otherwise hemodynamically stable with a SpO2 94% on room air.  Glucose 100, chloride 113, bicarbonate 19, BUN 63/creatinine 2.49, albumin 2.6, total protein 6.1.  Lactate 1.1.  Troponin at 1051 was 80.  Repeat at 1316 was 60.  WBC 8.4 with slight left shift.  Hemoglobin 9.8/hematocrit 31.7 (chronic anemia).  Coronavirus not detected.  VBG at 1051 shows pH 7.39, pCO2 34, pO2 27, bicarb 20.6.  Chest x-ray, and CT of the head/cervical spine results noted below.  Urinalysis shows red and turbid color with 2+ protein, 3+ blood, and some leukocyte esterase.    EKG (interpreted by ED physician): Paced rhythm at a rate of 120 with occasional PVCs present.    Admitting provider is Dr. 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     Social History  He has no history on file for tobacco use, alcohol use, and drug use.    Family History  No family history on file.     Allergies  Amlodipine, Benazepril, Hydrocodone, Losartan, Statins-hmg-coa reductase inhibitors, Tetracyclic antidepressants, and Tetracycline    Review of Systems  Limited ROS due to patient's current status    Physical Exam  Constitutional:       General: He is not in acute distress.     Appearance: He is normal weight.      Comments: Lethargic.  Unable to fully assess.  Opens eyes with verbal stimuli but does not answer questions.   HENT:      Head: Normocephalic and atraumatic.      Mouth/Throat:      Mouth: Mucous membranes are dry.   Eyes:      Extraocular Movements: Extraocular movements intact.      Conjunctiva/sclera: Conjunctivae normal.      Pupils: Pupils are equal, round, and reactive to light.   Cardiovascular:      Rate and Rhythm: Regular rhythm. Tachycardia present.      Pulses: Normal pulses.   Abdominal:      General: Bowel sounds are normal.      Palpations: Abdomen is soft.      Tenderness: There is no abdominal tenderness.   Musculoskeletal:         General: No swelling.      Right lower leg: No edema.      Left lower leg: No edema.   Skin:     General: Skin is warm and dry.   Neurological:      General: No focal deficit present.   Psychiatric:         Mood and Affect: Mood normal.         Behavior: Behavior normal.       Last Recorded Vitals  Blood pressure 131/61, pulse 97, temperature 36.7 °C (98.1 °F), temperature source Temporal, resp. rate 21, SpO2 99 %.    Relevant Results  Results for orders placed or performed during the hospital encounter of 12/04/23 (from the past 24 hour(s))   CBC   Result Value Ref Range    WBC 8.0 4.4 - 11.3 x10*3/uL    nRBC 0.0 0.0 - 0.0 /100 WBCs    RBC 3.35 (L) 4.50 - 5.90 x10*6/uL    Hemoglobin 9.0 (L) 13.5 - 17.5  g/dL    Hematocrit 28.8 (L) 41.0 - 52.0 %    MCV 86 80 - 100 fL    MCH 26.9 26.0 - 34.0 pg    MCHC 31.3 (L) 32.0 - 36.0 g/dL    RDW 16.3 (H) 11.5 - 14.5 %    Platelets 270 150 - 450 x10*3/uL   Basic metabolic panel   Result Value Ref Range    Glucose 85 74 - 99 mg/dL    Sodium 146 (H) 136 - 145 mmol/L    Potassium 3.6 3.5 - 5.3 mmol/L    Chloride 118 (H) 98 - 107 mmol/L    Bicarbonate 17 (L) 21 - 32 mmol/L    Anion Gap 15 10 - 20 mmol/L    Urea Nitrogen 58 (H) 6 - 23 mg/dL    Creatinine 2.38 (H) 0.50 - 1.30 mg/dL    eGFR 26 (L) >60 mL/min/1.73m*2    Calcium 9.2 8.6 - 10.3 mg/dL   POCT GLUCOSE   Result Value Ref Range    POCT Glucose 75 74 - 99 mg/dL   POCT GLUCOSE   Result Value Ref Range    POCT Glucose 73 (L) 74 - 99 mg/dL   POCT GLUCOSE   Result Value Ref Range    POCT Glucose 98 74 - 99 mg/dL      CT cervical spine wo IV contrast    Result Date: 12/4/2023  Interpreted By:  Rohan Bynum, STUDY: CT CERVICAL SPINE WO IV CONTRAST;  12/4/2023 11:54 am   INDICATION: Signs/Symptoms:altered mental status.   COMPARISON: None.   ACCESSION NUMBER(S): [id number]   ORDERING CLINICIAN: TERRY YBARRA   TECHNIQUE: Contiguous axial CT images were obtained at  2 mm slice thickness through the cervical spine without contrast administration. The images were then reconstructed in the coronal and sagittal planes.   FINDINGS: The patient is status post C3-4, C4-5 and C5-6 interbody fusion, with an anterior plate and screw fixation device extending from C3 through C6. There is no CT evidence of acute fracture identified. No prevertebral soft tissue swelling is identified.   ALIGNMENT: There is mild anterolisthesis of C7 on T1 and minimal anterolisthesis of C2 on C3.   VERTEBRAE/DISC SPACES: Severe disc space narrowing and bulky, bridging, osteophyte formation is seen at the C6-7 level. Mild disc space narrowing and bridging osteophyte formation is seen at the C2-3 and C7-T1 levels. Mild-to-moderate facet degenerative changes are  seen throughout the cervical spine.   C2-3: Or moderate left-sided neural foraminal narrowing is seen. No significant right foraminal or central canal narrowing is seen.   C3-4: Severe neural foraminal narrowing is seen. At least moderate central canal narrowing is seen.   C4-5: Severe neural foraminal narrowing is seen bilaterally. At least moderate to severe central canal narrowing is seen.   C5-6: Severe neural foraminal narrowing is seen bilaterally. At least moderate central canal narrowing is seen.   C6-7: Severe right-sided and moderate to severe left-sided neural foraminal narrowing is seen. At least moderate to severe central canal narrowing is seen.   C7-T1: Moderate right-sided and moderate to severe left-sided neural foraminal narrowing is seen. At least mild central canal narrowing is seen.   ADDITIONAL FINDINGS: Evaluation of the visualized soft tissues of the neck is limited by the lack of intravenous contrast.  Within this limitation, no gross mass or lymphadenopathy is identified.       1.  No CT evidence of acute fracture. 2.  Postoperative and degenerative changes throughout the cervical spine, as above.   Signed by: Rohan Bynum 12/4/2023 12:06 PM Dictation workstation:   [id number]    CT head wo IV contrast    Result Date: 12/4/2023  Interpreted By:  Sebastian Terrazas, STUDY: CT HEAD WO IV CONTRAST;  12/4/2023 10:31 am   INDICATION: Signs/Symptoms:Altered mental status.   COMPARISON: None.   ACCESSION NUMBER(S): [id number]   ORDERING CLINICIAN: TERRY YBARRA   TECHNIQUE: Noncontrast axial CT scan of head was performed. Motion artifact mildly degrades assessment.   FINDINGS: Parenchyma: There is no intracranial hemorrhage. The grey-white differentiation is intact. There is no mass effect or midline shift. Moderate chronic small vessel ischemic disease. Remote appearing lacunar infarcts involving the anterior subinsular regions bilaterally. Atherosclerotic calcification of the carotid siphons  bilaterally. Small nonspecific calcification within a right parietal sulcus (series 200, image 20).   CSF Spaces: Moderate generalized brain atrophy with disproportionate prominence of the ventricular collecting system, favor advanced central atrophy.   Extra-Axial Fluid: There is no extraaxial fluid collection.   Calvarium: The calvarium is unremarkable.   Paranasal sinuses: Visualized paranasal sinuses are clear.   Mastoids: Clear.   Orbits: Normal.   Soft tissues: Unremarkable.   Other: Partially visualized on this examination is a C1 Andres type burst fracture with 3 dominant ossific fragments. There is an element of spinal stenosis with the thecal sac measuring approximately 8 mm in anterior-posterior dimension (series 3, image 1).       Partially visualized C1 fracture; recommend dedicated CT cervical spine for further evaluation/characterization. Element of spinal stenosis evident.   No acute intracranial hemorrhage, mass effect, or CT apparent acute infarct.   Moderate chronic small vessel ischemic disease.   Moderate brain atrophy with disproportionate prominence of the ventricular collecting system, favor a component of advanced central atrophy.   MACRO: Sebastian Terrazas discussed the significance and urgency of this critical finding by telephone with  TERRY YBARRA on 12/4/2023 at 10:48 am. (**-RCF-**) Findings:  See findings.     Signed by: Sebastian Terrazas 12/4/2023 10:50 AM Dictation workstation:   [id number]    ECG 12 lead    Result Date: 12/4/2023  Ventricular-paced rhythm with occasional Premature ventricular complexes Abnormal ECG No previous ECGs available    XR chest 1 view    Result Date: 12/4/2023  Interpreted By:  Sebastian Terrazas, STUDY: XR CHEST 1 VIEW;  12/4/2023 9:58 am   INDICATION: Signs/Symptoms:altered mental status.   COMPARISON: None.   ACCESSION NUMBER(S): [id number]   ORDERING CLINICIAN: TERRY YBARRA   FINDINGS: AP radiograph of the chest was provided.   DEVICES: Left-sided  approach dual lead cardiac pacemaker.   CARDIOMEDIASTINAL SILHOUETTE: Cardiac silhouette appears enlarged which could be exaggerated by patient rotation. Mediastinal contours are grossly within normal limits with aortic atherosclerotic calcification evident.   LUNGS: Questionable ill-defined right perihilar parenchymal opacity. No discrete consolidative parenchymal airspace opacity appreciated. No pneumothorax. No large pleural effusion.   ABDOMEN: Questionable mild colonic gaseous distention of the bowel loops within the upper abdomen.   BONES: ACDF partially visualized. No acute osseous changes.       Examination limited by portable technique and patient rotation. Cardiomegaly with possible mild interstitial edema/vascular congestion. No gross consolidative opacity or large pleural effusion evident.   MACRO: None   Signed by: Sebastian Terrazas 12/4/2023 10:39 AM Dictation workstation:   [id number]       Assessment/Plan   Principal Problem:    Dehydration    Servando Mercado is a 83 y.o. male who presents from [facility] for failure to thrive and altered mental status.  According to the nursing notes he has not had any food or water for the past week and a half.  Says that patient is normally AO x 4, however today he is AO x 1.  Patient has a chronic Mccoy.  Mccoy catheter has blood present in the bag today.  Recent UTI on 11/30, patient was placed on IV antibiotics until 12/7 per Dr. Valencia.    #Failure to thrive  #Dehydration  #RIOS on CKD  #Recent UTI  Admit for observation and telemetry monitoring  Urine culture pending  Continue Zosyn  Normal saline  Swallow evaluation  N.p.o. diet until more alert  BMP, CBC in a.m.  PT/OT for evaluation and treatment  Social work consult for discharge planning  Q 4 vitals    #Blood in Mccoy catheter bag  Hold Plavix and anticoagulation until urine returns to normal    #Elevated troponin  Troponin at 1051 was 80, Repeat at 1316 was 60.  EKG reviewed    Continue at home  medications as appropriate    Chronic conditions:  #HTN  #PVD  #GERD  #Gout  #CKD    #DVT prophylaxis  SCDs, ambulation as tolerated  Hold anticoagulation due to current bleeding in Mccoy catheter       I spent 25 minutes in the professional and overall care of this patient.    Manas Gr, DO

## 2023-12-05 NOTE — CONSULTS
“Wound” Ostomy Consultation        1. Chief Complaint: wound check and evaluation   2. History of Present Illness:   Multiple deep tissue injury pressure ulcers   3. Past Medical History: Reviewed   4. Past Surgical History: Reviewed  5. Allergies:     Reviewed  6. Social/Family History: Reviewed  7. Medications:  Reviewed  8. Labs/Data/Test Results: Reviewed   9. Progress Notes:  Reviewed     10. System Review: system review was performed with these findings:   Integumentary: Will be described below    11.  Physical Examination:   Constitutional: ill appearing, No weight loss/fevers/chills.    Psych: Alert, oriented to person   Respiratory: Symmetrical expansion/effort; No cough/shortness of breath   Cardiovascular: No edema  Musculoskeletal: BLE contracted  Integumentary: Normal skin color, texture, and turgor, No dry/scaly/cracked skin; No ecchymotic areas; No friable skin; No rash/lesions/excoriation/burns; No diaphoresis; No jaundice, kike, pallor skin;   Right hip: red intact linear shape  Right ear: deep tissue injury pressure ulcer dark red - + drainage on pillow case - cannot be staged due to type of tissue   Right lateral leg: deep tissue injury pressure ulcer, red and purple, starting to open/evolve   Left medial foot to great toe: dark purple intact skin, deep tissue injury pressure ulcer   Left knee: dry scab  Left shin: light purple intact skin deep tissue injury pressure ulcer,  Scrotum/groins moist and red    12. Wound Pain/Discomfort: stated he was in pain - legs when he was turned and repositioned      Assessment/Plan/Treatment Recommendations:     Left foot. Right hip, right ear, right leg, left knee left shin, scrotum/groin: wash with wound wash, apply Venelex ointment 2 times a day, may cover with ABD pads, no foam dressings.    Turn as per policy offloading from pressure sites(s)  Nutrition following   Interventions as per Darryl Scale are in place    Education provided Questions answered  D/W  RN / MD  Orders received     I have spent 30 minutes with the patient for physical and wound assessment, wound cleaning, Unable to educate at this time     Zohreh Camilo RN WOCN

## 2023-12-05 NOTE — PROGRESS NOTES
Servando Mercado is a 83 y.o. male on day 0 of admission presenting with Dehydration.    Subjective   No events overnight. Patient remains in ED boarding. He is oriented to name and place today.       Objective     Physical Exam  Constitutional:       General: He is not in acute distress.     Appearance: He is normal weight.      Comments: Lethargic.  Unable to fully assess.  Opens eyes with verbal stimuli but does not answer questions.   HENT:      Head: Normocephalic and atraumatic.      Mouth/Throat:      Mouth: Mucous membranes are dry.   Eyes:      Extraocular Movements: Extraocular movements intact.      Conjunctiva/sclera: Conjunctivae normal.      Pupils: Pupils are equal, round, and reactive to light.   Cardiovascular:      Rate and Rhythm: Regular rhythm. Tachycardia present.      Pulses: Normal pulses.   Abdominal:      General: Bowel sounds are normal.      Palpations: Abdomen is soft.      Tenderness: There is no abdominal tenderness.   Musculoskeletal:         General: No swelling.      Right lower leg: No edema.      Left lower leg: No edema.   Skin:     General: Skin is warm and dry.   Neurological:      General: No focal deficit present.   Psychiatric:         Mood and Affect: Mood normal.         Behavior: Behavior normal.     Last Recorded Vitals  Blood pressure 131/61, pulse 97, temperature 36.7 °C (98.1 °F), temperature source Temporal, resp. rate 21, SpO2 99 %.  Intake/Output last 3 Shifts:  I/O last 3 completed shifts:  In: 1150 [IV Piggyback:1150]  Out: -     Relevant Results                This patient has a urinary catheter   Reason for the urinary catheter remaining today? urinary retention/bladder outlet obstruction, acute or chronic           Assessment/Plan   Principal Problem:    Dehydration    Servando Mercado is a 83 y.o. male who presents from [facility] for failure to thrive and altered mental status.  According to the nursing notes he has not had any food or water for the  past week and a half.  Says that patient is normally AO x 4, however today he is AO x 1.  Patient has a chronic Mccoy.  Mccoy catheter has blood present in the bag today.  Recent UTI on 11/30, patient was placed on IV antibiotics until 12/7 per Dr. Valencia.     #Failure to thrive  #Dehydration  #RIOS on CKD  #Recent UTI  #Hypernatremia  Admit for observation and telemetry monitoring  Urine culture pending  Continue Zosyn  Normal saline changed to D5W  Antibiotics with IV Zosyn  Swallow evaluation  N.p.o. diet until more alert  BMP, CBC in a.m.  PT/OT for evaluation and treatment  Social work consult for discharge planning  Q 4 vitals     #Blood in Mccoy catheter bag  Hold Plavix and anticoagulation until urine returns to normal     #Elevated troponin  Troponin at 1051 was 80, Repeat at 1316 was 60.  EKG reviewed     Continue at home medications as appropriate     Chronic conditions:  #HTN  #PVD  #GERD  #Gout  #CKD     #DVT prophylaxis  SCDs, ambulation as tolerated  Hold anticoagulation due to current bleeding in Mccoy catheter          I spent 45 minutes in the professional and overall care of this patient.      Manas Gr, DO

## 2023-12-05 NOTE — PROGRESS NOTES
Occupational Therapy                 Therapy Communication Note    Patient Name: Servando Mercado  MRN: [id number]  Today's Date: 12/5/2023     Discipline: Occupational Therapy    Missed Visit Reason:  (Attempted eval, patient A & O x1,impaired direction following, severely limited BUE/BLE ROM, contractures at hips/knees; Grimaces in pain & resists any movement. Discharge O.T. as patient not appropriate for therapy.)

## 2023-12-06 NOTE — PROGRESS NOTES
Speech-Language Pathology    Inpatient Speech-Language Pathology Clinical Swallow Evaluation    Patient Name: Servando Mercado  MRN: [id number]  Today's Date: 12/6/2023   Time Calculation  Start Time: 0940  Stop Time: 1005  Time Calculation (min): 25 min         Current Problem:   1. Dehydration        2. Acute kidney injury (CMS/HCC)        3. Failure to thrive in adult        4. Elevated troponin            Subjective   Current Problem:  Assess oropharyngeal swallowing mechanism. Patient failed bedside nursing swallow screening.      General Visit Information:  Patient Class: Inpatient  Living Environment: Nursing home (skilled/long-term)  Past Medical History Relevant to Rehab: Servando Mercado is a 83 y.o. male who presents from [facility] for failure to thrive and altered mental status.  According to the nursing notes he has not had any food or water for the past week and a half.  Says that patient is normally AO x 4, however today he is AO x 1.  Patient has a chronic Mccoy.  Mccoy catheter has blood present in the bag today.  Recent UTI on 11/30, patient was placed on IV antibiotics until 12/7 per Dr. Valencia.      Vital Signs:    12/4  CT Cervical spine FINDINGS:  The patient is status post C3-4, C4-5 and C5-6 interbody fusion, with an anterior plate and screw fixation device extending from C3 through C6. There is no CT evidence of acute fracture identified. No prevertebral soft tissue swelling is identified.      ALIGNMENT:  There is mild anterolisthesis of C7 on T1 and minimal anterolisthesis of C2 on C3.      VERTEBRAE/DISC SPACES:  Severe disc space narrowing and bulky, bridging, osteophyte formation is seen at the C6-7 level. Mild disc space narrowing and bridging osteophyte formation is seen at the C2-3 and C7-T1 levels. Mild-to-moderate facet degenerative changes are seen throughout the cervical spine.      12/4 CT Head IMPRESSION:  Partially visualized C1 fracture; recommend dedicated CT cervical  spine for further evaluation/characterization. Element of spinal stenosis evident.      No acute intracranial hemorrhage, mass effect, or CT apparent acute infarct.      Moderate chronic small vessel ischemic disease.      Moderate brain atrophy with disproportionate prominence of the  ventricular collecting system, favor a component of advanced central  atrophy.    12/4 CXR IMPRESSION:  LUNGS:  Questionable ill-defined right perihilar parenchymal opacity. No  discrete consolidative parenchymal airspace opacity appreciated. No  pneumothorax. No large pleural effusion.      ABDOMEN:  Questionable mild colonic gaseous distention of the bowel loops  within the upper abdomen.      BONES:  ACDF partially visualized. No acute osseous changes.      IMPRESSION:  Examination limited by portable technique and patient rotation.  Cardiomegaly with possible mild interstitial edema/vascular  congestion. No gross consolidative opacity or large pleural effusion  evident.    Objective       Baseline Assessment:   Patient wakes to verbal stimulus. Able to attend to task with clinician. Patient is oriented to self. Oral cavity severely dry. Edentulous status. Dried secretions adhered to lips and removed via moistened toothette and napkin.       Pain:  Pain Score: 0 - No pain. Patient does voice discomfort when moved in bed. Clinician assisted RN in rolling patient slightly to place a pain patch. Patient very stiff and immobile.      Oral/Motor Assessment:  Dentition: Edentulous      Consistencies Trialed:   Tsp and straw sips of mildly thick water, straw sips of thin water and puree      Clinical Observations:  Patient Positioning: Sidelying. Patient laying on his right side. Legs curled up/contracted toward the right.    Therapist provided patient initially with tsp sips of mildly thick water. Relatively prompt A-P transit and swallow response noted. Mild facial grimacing noted with patient expressing some slight pain when  swallowing. Per chart review, patient has not had anything to eat/drink in over a week so therapist anticipates that his throat is very dry. As trials continued, patient appeared to be enjoying sips of liquid, agreeing with therapist that it felt good to have something to drink. Patient consumed multiple consecutive sips of mildly thick and thin liquids. Subtle throat clearing intermittently elicited. No overt or subtle s/s of aspiration such as changes in vocal quality noted following liquid trials. Patient consumed tsp bites of applesauce with adequate bolus formation and timely A-P transit. No coughing or choking observed with pureed trials.    Recommendations:  Solid Diet Recommendations : Pureed/extremely thick  (IDDSI Level 4)  Liquid Diet Recommendations: Thin (IDDSI Level 0)  Compensatory Swallowing Strategies:   Upright 90 degrees as possible for all oral intake,   2.   One to one assist with meals    Medication Administration Recommendations: Crushed, With Pureed    Dysphagia Goals:   Patient will tolerate recommended diet without observed clinical signs of aspiration.  2.   Patient/family education.       Plan:  SLP Frequency: 1x per week  Duration: 1 week  Discussed POC: Patient, Nursing, Physician    Plan for 1x follow up to assess safety/tolerance of recommended diet and determine if further instrumental assessment is warranted pending bedside and/or clinical indicators of aspiration. Per CT cervical spine, patient appears to have had an ACDF in the past as well as bulky osteophyte formation at C6-C7. At this time, patient with lower extremity contractures and body is very stiff to move just in the bed. Patient may not be able to successfully participate in MBS d/t physical limitations.    Inpatient:  Education Comments:  Results/recommendations were discussed with RN and physician, who both verbalized understanding.

## 2023-12-06 NOTE — CONSULTS
"Reason For Consult  hematuria    History Of Present Illness  Servando Meracdo is a 83 y.o. male presenting with PMHx of  CKD, HTN, PVD, GERD, and gout who presented from [facility] for failure to thrive and altered mental status.  According to the nursing notes he has not had any food or water for the past week and a half.  Says that patient is normally AO x 4, however today he is AO x 1. He has a chronic Mccoy and yesterday was noted to have hematuria. He recently had been treated for UTI.      While in the ED:  ER course: BUN 63/creatinine 2.49, albumin 2.6, total protein 6.1.  Lactate 1.1.   WBC 8.4 with slight left shift.  Hemoglobin 9.8/hematocrit 31.7 (chronic anemia).  Urinalysis shows red and turbid color with 2+ protein, 3+ blood, and some leukocyte esterase..     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    Surgical History  He has no past surgical history on file.     Social History  He has no history on file for tobacco use, alcohol use, and drug use.    Family History  No family history on file.     Allergies  Amlodipine, Benazepril, Hydrocodone, Losartan, Statins-hmg-coa reductase inhibitors, Tetracyclic antidepressants, and Tetracycline    Review of Systems  Unable to obtain given current altered mentation      Physical Exam  No acute distress  Mccoy to CD with urine clear     Last Recorded Vitals  Blood pressure 127/62, pulse 103, temperature 37.3 °C (99.1 °F), temperature source Temporal, resp. rate 20, height 1.778 m (5' 10\"), weight 88 kg (194 lb 0.1 oz), SpO2 95 %.    Relevant Results      balsam peru-castor oiL, , Topical, BID  [Held by " provider] clopidogrel, 75 mg, oral, Daily  docusate sodium, 100 mg, oral, BID  famotidine, 20 mg, oral, Daily   Or  famotidine, 20 mg, intravenous, Daily  lidocaine, 1 patch, transdermal, Daily  metoprolol, 5 mg, intravenous, q6h  piperacillin-tazobactam-dextrose, 2.25 g, intravenous, q8h       Results for orders placed or performed during the hospital encounter of 12/04/23 (from the past 24 hour(s))   POCT GLUCOSE   Result Value Ref Range    POCT Glucose 73 (L) 74 - 99 mg/dL   POCT GLUCOSE   Result Value Ref Range    POCT Glucose 98 74 - 99 mg/dL   CBC   Result Value Ref Range    WBC 6.9 4.4 - 11.3 x10*3/uL    nRBC 0.0 0.0 - 0.0 /100 WBCs    RBC 3.13 (L) 4.50 - 5.90 x10*6/uL    Hemoglobin 8.4 (L) 13.5 - 17.5 g/dL    Hematocrit 26.9 (L) 41.0 - 52.0 %    MCV 86 80 - 100 fL    MCH 26.8 26.0 - 34.0 pg    MCHC 31.2 (L) 32.0 - 36.0 g/dL    RDW 16.2 (H) 11.5 - 14.5 %    Platelets 260 150 - 450 x10*3/uL   Basic metabolic panel   Result Value Ref Range    Glucose 121 (H) 74 - 99 mg/dL    Sodium 143 136 - 145 mmol/L    Potassium 3.3 (L) 3.5 - 5.3 mmol/L    Chloride 117 (H) 98 - 107 mmol/L    Bicarbonate 19 (L) 21 - 32 mmol/L    Anion Gap 10 10 - 20 mmol/L    Urea Nitrogen 54 (H) 6 - 23 mg/dL    Creatinine 2.07 (H) 0.50 - 1.30 mg/dL    eGFR 31 (L) >60 mL/min/1.73m*2    Calcium 8.6 8.6 - 10.3 mg/dL    CT cervical spine wo IV contrast    Result Date: 12/4/2023  Interpreted By:  Rohan Bynum, STUDY: CT CERVICAL SPINE WO IV CONTRAST;  12/4/2023 11:54 am   INDICATION: Signs/Symptoms:altered mental status.   COMPARISON: None.   ACCESSION NUMBER(S): [id number]   ORDERING CLINICIAN: TERRY YBARRA   TECHNIQUE: Contiguous axial CT images were obtained at  2 mm slice thickness through the cervical spine without contrast administration. The images were then reconstructed in the coronal and sagittal planes.   FINDINGS: The patient is status post C3-4, C4-5 and C5-6 interbody fusion, with an anterior plate and screw fixation device  extending from C3 through C6. There is no CT evidence of acute fracture identified. No prevertebral soft tissue swelling is identified.   ALIGNMENT: There is mild anterolisthesis of C7 on T1 and minimal anterolisthesis of C2 on C3.   VERTEBRAE/DISC SPACES: Severe disc space narrowing and bulky, bridging, osteophyte formation is seen at the C6-7 level. Mild disc space narrowing and bridging osteophyte formation is seen at the C2-3 and C7-T1 levels. Mild-to-moderate facet degenerative changes are seen throughout the cervical spine.   C2-3: Or moderate left-sided neural foraminal narrowing is seen. No significant right foraminal or central canal narrowing is seen.   C3-4: Severe neural foraminal narrowing is seen. At least moderate central canal narrowing is seen.   C4-5: Severe neural foraminal narrowing is seen bilaterally. At least moderate to severe central canal narrowing is seen.   C5-6: Severe neural foraminal narrowing is seen bilaterally. At least moderate central canal narrowing is seen.   C6-7: Severe right-sided and moderate to severe left-sided neural foraminal narrowing is seen. At least moderate to severe central canal narrowing is seen.   C7-T1: Moderate right-sided and moderate to severe left-sided neural foraminal narrowing is seen. At least mild central canal narrowing is seen.   ADDITIONAL FINDINGS: Evaluation of the visualized soft tissues of the neck is limited by the lack of intravenous contrast.  Within this limitation, no gross mass or lymphadenopathy is identified.       1.  No CT evidence of acute fracture. 2.  Postoperative and degenerative changes throughout the cervical spine, as above.   Signed by: Rohan Bynum 12/4/2023 12:06 PM Dictation workstation:   [id number]    CT head wo IV contrast    Result Date: 12/4/2023  Interpreted By:  Sebastian Terrazas, STUDY: CT HEAD WO IV CONTRAST;  12/4/2023 10:31 am   INDICATION: Signs/Symptoms:Altered mental status.   COMPARISON: None.   ACCESSION  NUMBER(S): [id number]   ORDERING CLINICIAN: TERRY YBARRA   TECHNIQUE: Noncontrast axial CT scan of head was performed. Motion artifact mildly degrades assessment.   FINDINGS: Parenchyma: There is no intracranial hemorrhage. The grey-white differentiation is intact. There is no mass effect or midline shift. Moderate chronic small vessel ischemic disease. Remote appearing lacunar infarcts involving the anterior subinsular regions bilaterally. Atherosclerotic calcification of the carotid siphons bilaterally. Small nonspecific calcification within a right parietal sulcus (series 200, image 20).   CSF Spaces: Moderate generalized brain atrophy with disproportionate prominence of the ventricular collecting system, favor advanced central atrophy.   Extra-Axial Fluid: There is no extraaxial fluid collection.   Calvarium: The calvarium is unremarkable.   Paranasal sinuses: Visualized paranasal sinuses are clear.   Mastoids: Clear.   Orbits: Normal.   Soft tissues: Unremarkable.   Other: Partially visualized on this examination is a C1 Andres type burst fracture with 3 dominant ossific fragments. There is an element of spinal stenosis with the thecal sac measuring approximately 8 mm in anterior-posterior dimension (series 3, image 1).       Partially visualized C1 fracture; recommend dedicated CT cervical spine for further evaluation/characterization. Element of spinal stenosis evident.   No acute intracranial hemorrhage, mass effect, or CT apparent acute infarct.   Moderate chronic small vessel ischemic disease.   Moderate brain atrophy with disproportionate prominence of the ventricular collecting system, favor a component of advanced central atrophy.   MACRO: Sebastian Terrazas discussed the significance and urgency of this critical finding by telephone with  TERRY YBARRA on 12/4/2023 at 10:48 am. (**-RCF-**) Findings:  See findings.     Signed by: Sebastian Terrazas 12/4/2023 10:50 AM Dictation workstation:    [id number]    ECG 12 lead    Result Date: 12/4/2023  Ventricular-paced rhythm with occasional Premature ventricular complexes Abnormal ECG No previous ECGs available    XR chest 1 view    Result Date: 12/4/2023  Interpreted By:  Sebastian Terrazas, STUDY: XR CHEST 1 VIEW;  12/4/2023 9:58 am   INDICATION: Signs/Symptoms:altered mental status.   COMPARISON: None.   ACCESSION NUMBER(S): [id number]   ORDERING CLINICIAN: TERRY YBARRA   FINDINGS: AP radiograph of the chest was provided.   DEVICES: Left-sided approach dual lead cardiac pacemaker.   CARDIOMEDIASTINAL SILHOUETTE: Cardiac silhouette appears enlarged which could be exaggerated by patient rotation. Mediastinal contours are grossly within normal limits with aortic atherosclerotic calcification evident.   LUNGS: Questionable ill-defined right perihilar parenchymal opacity. No discrete consolidative parenchymal airspace opacity appreciated. No pneumothorax. No large pleural effusion.   ABDOMEN: Questionable mild colonic gaseous distention of the bowel loops within the upper abdomen.   BONES: ACDF partially visualized. No acute osseous changes.       Examination limited by portable technique and patient rotation. Cardiomegaly with possible mild interstitial edema/vascular congestion. No gross consolidative opacity or large pleural effusion evident.   MACRO: None   Signed by: Sebastian Terrazas 12/4/2023 10:39 AM Dictation workstation:   [id number]       Assessment/Plan     83 yom chronic dooley with hematuria  -Dooley to CD with urine clear  -Will obtain CT scan without contract at this time but will need CTU once Cr improves      Thank you for allowing us to participate in his care.     I spent 30 minutes in the professional and overall care of this patient.      Cody Thomas PA-C

## 2023-12-06 NOTE — CONSULTS
"Nutrition Assessment Note  Nutrition Note  Reason for Assessment  Reason for Assessment: Admission nursing screening (MST=3)     Assessment    Nutrition History:  Energy Intake:  (NPO at time of RD visit, now on pureed diet)  Food and Nutrient History: Pt is disoriented x3 with garbled speech. Unable to obtain much history from pt. Pt did state he does not have an appetite but was unable to say how long. Pt admitted from SNF. Per H&P pt did not eat or drink x 1.5 weeks PTA.  Vitamin/Herbal Supplement Use: 500 mg Vitamin C    Difficulty chewing: Pureed Diet  Difficulty swallowing: Pureed Diet    PMH, meds, and labs reviewed.  Dietary Orders (From admission, onward)       Start     Ordered    12/06/23 1012  Adult diet Regular; Pureed 4; Thin 0  Diet effective now        Question Answer Comment   Diet type Regular    Texture Pureed 4    Fluid consistency Thin 0        12/06/23 1011                       GI per flowsheet:  Gastrointestinal  Gastrointestinal (WDL): Exceptions to WDL  Bowel Incontinence: Yes  Last bowel movement documented:    Allergies: Amlodipine, Benazepril, Hydrocodone, Losartan, Statins-hmg-coa reductase inhibitors, Tetracyclic antidepressants, and Tetracycline     Anthropometrics:  Height: 177.8 cm (5' 10\")  Weight: 88 kg (194 lb 0.1 oz)  BMI (Calculated): 27.84  IBW: 75.5 kg      Weight History / % Weight Change: Pt unable to provide wt history. No EMR weight records for the last year.                   Estimated Nutritional Needs:  Method for Estimating Needs: 1760-2025kcals (20-23kcals/kg)    Method for Estimating Needs: 61-76g (0.8-1g/kg)    Method for Estimating Needs: ~1900ml (25ml/kg IBW)    Nutrition Focused Physical Findings:   Orbital Fat Pads: Well nourshed (slightly bulging fat pads)  Buccal Fat Pads: Well nourished (full, rounded cheeks)  Triceps: Well nourished (ample fat tissue)  Ribs: Defer    Temporalis: Well nourished (well-defined muscle)  Pectoralis (Clavicular Region): Well " nourished (clavicle not visible)  Deltoid/Trapezius: Well nourished (rounded appearance at arm, shoulder, neck)  Interosseous: Well nourished (muscle bulges)  Trapezius/Infraspinatus/Supraspinatus (Scapular Region): Defer  Quadriceps: Defer  Gastrocnemius: Defer    Edema  Edema:  (non-pitting)  Edema Location: generalized    Nails: Negative  Skin: Positive (stage 2 pressure ulcer L foot, multiple scabbed areas)  Pain Score: 0 - No pain     Nutrition Diagnosis   Patient has Malnutrition Diagnosis: No    Patient has Nutrition Diagnosis: Yes  New  Nutrition Diagnosis 1: Predicted inadequate energy intake  Related to (1): impaired mental status  As Evidenced by (1): pt report of not having an appetite, anticipate pt will eat poorly       Plan    Interventions  Individualized Nutrition Prescription Provided for : Pureed diet per SLP recommendations, mighty shake BID (for an additional 220 kcals, 6 gm protein each), Magic cup  once daily (for an additional 290 kcals, 9 gm protein each)     Interventions: Meals and snacks, Medical food supplement  Meals and Snacks: Texture-modified diet  Goal: Intake >50% of all meals  Medical Food Supplement: Commercial beverage, Commercial food  Goal: intake >75% ONS      Nutrition Monitoring and Evaluation   Body Composition/Growth/Weight History  Monitoring and Evaluation Plan: Weight  Weight: Measured weight  Criteria: maintain weight, reweigh at least every 5 days  Food/Nutrient Related History Monitoring  Monitoring and Evaluation Plan: Energy intake, Fluid intake, Amount of food  Energy Intake: Estimated energy intake  Criteria: energy intake compared to estimated need  Fluid Intake: Estimated fluid intake  Criteria: fluid intake compared to estimated need  Amount of Food: Estimated amout of food, Medical food intake  Criteria: percent of meals/ONS consumed  Nutrition Focused Physical Findings  Monitoring and Evaluation Plan: Skin  Skin: Impaired wound healing  Criteria: Pressure  ulcer healing      Education Documentation  No documentation found.    Pt not appropriate for education at this time.              Time Spent (min): 45 minutes  Last Date of Nutrition Visit: 12/06/23  Nutrition Follow-Up Needed?: 3-5 days

## 2023-12-06 NOTE — PROGRESS NOTES
12/06/23 1422   Discharge Planning   Living Arrangements Alone   Support Systems Spouse/significant other   Type of Residence Nursing home/residential care   Patient expects to be discharged to: Return to the [facility]     12/6/2023  Per notes- pt not oriented.  No family present.  Called and spoke with wife, introduced self and explained role.  Verified insurance, and emergency contact.   PCP- Erik.  Pt is from the [facility] and wife stated the plan is to return upon discharge.  Stated he has been there for 2 years.  Support provided and questions answered.    Asked CHANELLE Ding to make return referral.  Sena García RN [facility]

## 2023-12-06 NOTE — CONSULTS
Inpatient consult to Palliative Care  Consult performed by: KOLE Forbes-CNP  Consult ordered by: Manas Gr DO          Reason For Consult  Reason for Consult: communication / medical decision making     History Of Present Illness  Servando Mercado is a 83 y.o. male with past medical history of  CKD, hypertension, PVD, GERD, and gout  is presenting with failure to thrive and altered mental status.  The patient is from SNF, and SNF staff reported the patient has not been eating or drinking for a week and a half, and is normally A&O x 4, but was noted to be A&O x 1; the patient also has a chronic urinary catheter present, and the patient was noted to have hematuria the day before he was sent to the ED.  Per notes, the patient was recently treated for UTI.  Significant findings in the emergency room included BUN 63, creatinine 2.49, serum albumin 2.6, hemoglobin 9.8 (chronic), and UA showed red and turbid color with protein, blood, and leukocyte esterase present.  The patient was admitted under the medicine service for dehydration.  Urology was consulted for evaluation of the patient's hematuria, and recommendation was for CT scan without contrast as well as for maintaining the patient's urinary catheter for continued drainage.  Palliative care was consulted to discuss goals of care and advance care planning.    Upon assessment of the patient this morning, the patient was resting in his bed and does remain quite confused.  The patient was able to answer some simple questions, but often mumbled and did not provide meaningful conversation.  The patient was evaluated by speech therapy, and is approved for puréed solids and thin liquids; the patient's nurse reported she will be assisting the patient at lunchtime to see if he will have desire for oral intake.  The patient's urinary catheter bag contained a small amount of dark yellow urine; the patient's nurse reported only occasional clots have been  present today.  It is unknown when the patient's last bowel movement was, but he did deny feeling constipated.  No family was present during my initial assessment.    ESAS ([place] Symptom Assessment System): Unable to assess due to the patient's condition and mentation.    PPS (Palliative Prognostic Scale): Unknown previously.  Currently, 10%    PMH: as above    Personal/Social History  The patient previously resides in SNF  He has no history on file for tobacco use, alcohol use, and drug use.    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    Surgical History  He has no past surgical history on file.     Family History  No family history on file.  Allergies  Amlodipine, Benazepril, Hydrocodone, Losartan, Statins-hmg-coa reductase inhibitors, Tetracyclic antidepressants, and Tetracycline    Review of Systems   Reason unable to perform ROS: Current condition and mentation.        Physical Exam  Constitutional:       General: He is not in acute distress.     Appearance: He is ill-appearing.      Comments: Elderly, frail, profoundly confused   HENT:      Head: Normocephalic and atraumatic.      Nose: Nose normal.      Mouth/Throat:      Mouth: Mucous membranes are moist.      Pharynx: Oropharynx is clear.   Eyes:      Pupils: Pupils are equal, round, and reactive to light.   Cardiovascular:      Rate and Rhythm: Regular rhythm. Tachycardia present.      Heart sounds: Normal heart sounds.   Pulmonary:      Effort: Pulmonary effort is normal.      Breath sounds: Normal breath sounds.   Abdominal:      General: Bowel sounds are normal. There is no distension.      Palpations:  "Abdomen is soft.      Tenderness: There is no abdominal tenderness. There is no guarding or rebound.   Genitourinary:     Comments: Indwelling urinary catheter present  Musculoskeletal:         General: No deformity.      Cervical back: Normal range of motion.   Skin:     General: Skin is warm and dry.      Coloration: Skin is pale.      Comments: Bilateral feet noted to be slightly reddened and with increased dry skin   Neurological:      Mental Status: He is alert. He is disoriented.      Motor: Weakness present.      Comments: A&O x 1, does answer some simple questions, occasional clear speech with otherwise severe dysarthria, does follow some simple commands.   Psychiatric:         Mood and Affect: Mood normal.         Behavior: Behavior normal.      Comments: Without restlessness         Last Recorded Vitals  Blood pressure 127/62, pulse 103, temperature 37.3 °C (99.1 °F), temperature source Temporal, resp. rate 20, height 1.778 m (5' 10\"), weight 88 kg (194 lb 0.1 oz), SpO2 95 %.    Relevant Results  Scheduled medications  balsam peru-castor oiL, , Topical, BID  [Held by provider] clopidogrel, 75 mg, oral, Daily  docusate sodium, 100 mg, oral, BID  famotidine, 20 mg, oral, Daily   Or  famotidine, 20 mg, intravenous, Daily  lidocaine, 1 patch, transdermal, Daily  metoprolol, 5 mg, intravenous, q6h  piperacillin-tazobactam-dextrose, 2.25 g, intravenous, q8h      Continuous medications  dextrose 5 % in water (D5W), 100 mL/hr, Last Rate: 100 mL/hr (12/06/23 0932)      PRN medications  PRN medications: acetaminophen **OR** acetaminophen, bisacodyl, magnesium hydroxide, melatonin, polyethylene glycol, traMADol    Results for orders placed or performed during the hospital encounter of 12/04/23 (from the past 96 hour(s))   Sars-CoV-2 PCR, Symptomatic   Result Value Ref Range    Coronavirus 2019, PCR Not Detected Not Detected   ECG 12 lead   Result Value Ref Range    Ventricular Rate 120 BPM    Atrial Rate 119 BPM    " QRS Duration 172 ms    QT Interval 408 ms    QTC Calculation(Bazett) 576 ms    R Axis -79 degrees    T Axis 90 degrees    QRS Count 20 beats    Q Onset 188 ms    T Offset 392 ms    QTC Fredericia 514 ms   CBC and Auto Differential   Result Value Ref Range    WBC 8.4 4.4 - 11.3 x10*3/uL    nRBC 0.0 0.0 - 0.0 /100 WBCs    RBC 3.65 (L) 4.50 - 5.90 x10*6/uL    Hemoglobin 9.8 (L) 13.5 - 17.5 g/dL    Hematocrit 31.7 (L) 41.0 - 52.0 %    MCV 87 80 - 100 fL    MCH 26.8 26.0 - 34.0 pg    MCHC 30.9 (L) 32.0 - 36.0 g/dL    RDW 16.1 (H) 11.5 - 14.5 %    Platelets 287 150 - 450 x10*3/uL    Neutrophils % 73.5 40.0 - 80.0 %    Immature Granulocytes %, Automated 1.1 (H) 0.0 - 0.9 %    Lymphocytes % 13.5 13.0 - 44.0 %    Monocytes % 8.7 2.0 - 10.0 %    Eosinophils % 2.5 0.0 - 6.0 %    Basophils % 0.7 0.0 - 2.0 %    Neutrophils Absolute 6.15 (H) 1.60 - 5.50 x10*3/uL    Immature Granulocytes Absolute, Automated 0.09 0.00 - 0.50 x10*3/uL    Lymphocytes Absolute 1.13 0.80 - 3.00 x10*3/uL    Monocytes Absolute 0.73 0.05 - 0.80 x10*3/uL    Eosinophils Absolute 0.21 0.00 - 0.40 x10*3/uL    Basophils Absolute 0.06 0.00 - 0.10 x10*3/uL   Comprehensive metabolic panel   Result Value Ref Range    Glucose 100 (H) 74 - 99 mg/dL    Sodium 143 136 - 145 mmol/L    Potassium 3.9 3.5 - 5.3 mmol/L    Chloride 113 (H) 98 - 107 mmol/L    Bicarbonate 19 (L) 21 - 32 mmol/L    Anion Gap 15 10 - 20 mmol/L    Urea Nitrogen 63 (H) 6 - 23 mg/dL    Creatinine 2.49 (H) 0.50 - 1.30 mg/dL    eGFR 25 (L) >60 mL/min/1.73m*2    Calcium 9.4 8.6 - 10.3 mg/dL    Albumin 2.6 (L) 3.4 - 5.0 g/dL    Alkaline Phosphatase 73 33 - 136 U/L    Total Protein 6.1 (L) 6.4 - 8.2 g/dL    AST 25 9 - 39 U/L    Bilirubin, Total 0.5 0.0 - 1.2 mg/dL    ALT 14 10 - 52 U/L   Lipase   Result Value Ref Range    Lipase 36 9 - 82 U/L   Lactate   Result Value Ref Range    Lactate 1.1 0.4 - 2.0 mmol/L   Blood Gas Venous   Result Value Ref Range    POCT pH, Venous 7.39 7.33 - 7.43 pH    POCT pCO2,  Venous 34 (L) 41 - 51 mm Hg    POCT pO2, Venous 27 (L) 35 - 45 mm Hg    POCT SO2, Venous 49 45 - 75 %    POCT Oxy Hemoglobin, Venous 47.5 45.0 - 75.0 %    POCT Base Excess, Venous -3.6 (L) -2.0 - 3.0 mmol/L    POCT HCO3 Calculated, Venous 20.6 (L) 22.0 - 26.0 mmol/L    Patient Temperature 37.0 degrees Celsius    FiO2 21 %    Test Comment ICU-S    Troponin I, High Sensitivity   Result Value Ref Range    Troponin I, High Sensitivity 80 (HH) 0 - 20 ng/L   Urinalysis with Reflex Microscopic and Culture   Result Value Ref Range    Color, Urine Red (N) Straw, Yellow    Appearance, Urine Turbid (N) Clear    Specific Gravity, Urine 1.020 1.005 - 1.035    pH, Urine 6.0 5.0, 5.5, 6.0, 6.5, 7.0, 7.5, 8.0    Protein, Urine 100 (2+) (A) NEGATIVE, 10 (TRACE) mg/dL    Glucose, Urine NEGATIVE NEGATIVE mg/dL    Blood, Urine 1.0 (3+) (A) NEGATIVE    Ketones, Urine NEGATIVE NEGATIVE mg/dL    Bilirubin, Urine NEGATIVE NEGATIVE    Urobilinogen, Urine NORM NORM mg/dL    Nitrite, Urine NEGATIVE NEGATIVE    Leukocyte Esterase, Urine 75 Nidia/µL (A) NEGATIVE   Extra Urine Gray Tube   Result Value Ref Range    Extra Tube Hold for add-ons.    Microscopic Only, Urine   Result Value Ref Range    WBC, Urine NONE 1-5, NONE /HPF    RBC, Urine >20 (A) NONE, 1-2, 3-5 /HPF   Urine Culture    Specimen: Indwelling (Mccoy) Catheter; Urine   Result Value Ref Range    Urine Culture No growth    Troponin I, High Sensitivity   Result Value Ref Range    Troponin I, High Sensitivity 60 (HH) 0 - 20 ng/L   CBC   Result Value Ref Range    WBC 8.0 4.4 - 11.3 x10*3/uL    nRBC 0.0 0.0 - 0.0 /100 WBCs    RBC 3.35 (L) 4.50 - 5.90 x10*6/uL    Hemoglobin 9.0 (L) 13.5 - 17.5 g/dL    Hematocrit 28.8 (L) 41.0 - 52.0 %    MCV 86 80 - 100 fL    MCH 26.9 26.0 - 34.0 pg    MCHC 31.3 (L) 32.0 - 36.0 g/dL    RDW 16.3 (H) 11.5 - 14.5 %    Platelets 270 150 - 450 x10*3/uL   Basic metabolic panel   Result Value Ref Range    Glucose 85 74 - 99 mg/dL    Sodium 146 (H) 136 - 145 mmol/L     Potassium 3.6 3.5 - 5.3 mmol/L    Chloride 118 (H) 98 - 107 mmol/L    Bicarbonate 17 (L) 21 - 32 mmol/L    Anion Gap 15 10 - 20 mmol/L    Urea Nitrogen 58 (H) 6 - 23 mg/dL    Creatinine 2.38 (H) 0.50 - 1.30 mg/dL    eGFR 26 (L) >60 mL/min/1.73m*2    Calcium 9.2 8.6 - 10.3 mg/dL   POCT GLUCOSE   Result Value Ref Range    POCT Glucose 75 74 - 99 mg/dL   POCT GLUCOSE   Result Value Ref Range    POCT Glucose 73 (L) 74 - 99 mg/dL   POCT GLUCOSE   Result Value Ref Range    POCT Glucose 98 74 - 99 mg/dL   CBC   Result Value Ref Range    WBC 6.9 4.4 - 11.3 x10*3/uL    nRBC 0.0 0.0 - 0.0 /100 WBCs    RBC 3.13 (L) 4.50 - 5.90 x10*6/uL    Hemoglobin 8.4 (L) 13.5 - 17.5 g/dL    Hematocrit 26.9 (L) 41.0 - 52.0 %    MCV 86 80 - 100 fL    MCH 26.8 26.0 - 34.0 pg    MCHC 31.2 (L) 32.0 - 36.0 g/dL    RDW 16.2 (H) 11.5 - 14.5 %    Platelets 260 150 - 450 x10*3/uL   Basic metabolic panel   Result Value Ref Range    Glucose 121 (H) 74 - 99 mg/dL    Sodium 143 136 - 145 mmol/L    Potassium 3.3 (L) 3.5 - 5.3 mmol/L    Chloride 117 (H) 98 - 107 mmol/L    Bicarbonate 19 (L) 21 - 32 mmol/L    Anion Gap 10 10 - 20 mmol/L    Urea Nitrogen 54 (H) 6 - 23 mg/dL    Creatinine 2.07 (H) 0.50 - 1.30 mg/dL    eGFR 31 (L) >60 mL/min/1.73m*2    Calcium 8.6 8.6 - 10.3 mg/dL     CT cervical spine wo IV contrast    Result Date: 12/4/2023  Interpreted By:  Rohan Bynum, STUDY: CT CERVICAL SPINE WO IV CONTRAST;  12/4/2023 11:54 am   INDICATION: Signs/Symptoms:altered mental status.   COMPARISON: None.   ACCESSION NUMBER(S): [id number]   ORDERING CLINICIAN: TERRY YBARRA   TECHNIQUE: Contiguous axial CT images were obtained at  2 mm slice thickness through the cervical spine without contrast administration. The images were then reconstructed in the coronal and sagittal planes.   FINDINGS: The patient is status post C3-4, C4-5 and C5-6 interbody fusion, with an anterior plate and screw fixation device extending from C3 through C6. There is no CT  evidence of acute fracture identified. No prevertebral soft tissue swelling is identified.   ALIGNMENT: There is mild anterolisthesis of C7 on T1 and minimal anterolisthesis of C2 on C3.   VERTEBRAE/DISC SPACES: Severe disc space narrowing and bulky, bridging, osteophyte formation is seen at the C6-7 level. Mild disc space narrowing and bridging osteophyte formation is seen at the C2-3 and C7-T1 levels. Mild-to-moderate facet degenerative changes are seen throughout the cervical spine.   C2-3: Or moderate left-sided neural foraminal narrowing is seen. No significant right foraminal or central canal narrowing is seen.   C3-4: Severe neural foraminal narrowing is seen. At least moderate central canal narrowing is seen.   C4-5: Severe neural foraminal narrowing is seen bilaterally. At least moderate to severe central canal narrowing is seen.   C5-6: Severe neural foraminal narrowing is seen bilaterally. At least moderate central canal narrowing is seen.   C6-7: Severe right-sided and moderate to severe left-sided neural foraminal narrowing is seen. At least moderate to severe central canal narrowing is seen.   C7-T1: Moderate right-sided and moderate to severe left-sided neural foraminal narrowing is seen. At least mild central canal narrowing is seen.   ADDITIONAL FINDINGS: Evaluation of the visualized soft tissues of the neck is limited by the lack of intravenous contrast.  Within this limitation, no gross mass or lymphadenopathy is identified.       1.  No CT evidence of acute fracture. 2.  Postoperative and degenerative changes throughout the cervical spine, as above.   Signed by: Rohan Bynum 12/4/2023 12:06 PM Dictation workstation:   [id number]    CT head wo IV contrast    Result Date: 12/4/2023  Interpreted By:  Sebastian Terrazas, STUDY: CT HEAD WO IV CONTRAST;  12/4/2023 10:31 am   INDICATION: Signs/Symptoms:Altered mental status.   COMPARISON: None.   ACCESSION NUMBER(S): [id number]   ORDERING CLINICIAN:  TERRY YBARRA   TECHNIQUE: Noncontrast axial CT scan of head was performed. Motion artifact mildly degrades assessment.   FINDINGS: Parenchyma: There is no intracranial hemorrhage. The grey-white differentiation is intact. There is no mass effect or midline shift. Moderate chronic small vessel ischemic disease. Remote appearing lacunar infarcts involving the anterior subinsular regions bilaterally. Atherosclerotic calcification of the carotid siphons bilaterally. Small nonspecific calcification within a right parietal sulcus (series 200, image 20).   CSF Spaces: Moderate generalized brain atrophy with disproportionate prominence of the ventricular collecting system, favor advanced central atrophy.   Extra-Axial Fluid: There is no extraaxial fluid collection.   Calvarium: The calvarium is unremarkable.   Paranasal sinuses: Visualized paranasal sinuses are clear.   Mastoids: Clear.   Orbits: Normal.   Soft tissues: Unremarkable.   Other: Partially visualized on this examination is a C1 Andres type burst fracture with 3 dominant ossific fragments. There is an element of spinal stenosis with the thecal sac measuring approximately 8 mm in anterior-posterior dimension (series 3, image 1).       Partially visualized C1 fracture; recommend dedicated CT cervical spine for further evaluation/characterization. Element of spinal stenosis evident.   No acute intracranial hemorrhage, mass effect, or CT apparent acute infarct.   Moderate chronic small vessel ischemic disease.   Moderate brain atrophy with disproportionate prominence of the ventricular collecting system, favor a component of advanced central atrophy.   MACRO: Sebastian Terrazas discussed the significance and urgency of this critical finding by telephone with  TERRY YBARRA on 12/4/2023 at 10:48 am. (**-RCF-**) Findings:  See findings.     Signed by: Sebastian Terrazas 12/4/2023 10:50 AM Dictation workstation:   [id number]    ECG 12 lead    Result Date:  12/4/2023  Ventricular-paced rhythm with occasional Premature ventricular complexes Abnormal ECG No previous ECGs available    XR chest 1 view    Result Date: 12/4/2023  Interpreted By:  Sebastian Terrazas, STUDY: XR CHEST 1 VIEW;  12/4/2023 9:58 am   INDICATION: Signs/Symptoms:altered mental status.   COMPARISON: None.   ACCESSION NUMBER(S): [id number]   ORDERING CLINICIAN: TERRY YBARRA   FINDINGS: AP radiograph of the chest was provided.   DEVICES: Left-sided approach dual lead cardiac pacemaker.   CARDIOMEDIASTINAL SILHOUETTE: Cardiac silhouette appears enlarged which could be exaggerated by patient rotation. Mediastinal contours are grossly within normal limits with aortic atherosclerotic calcification evident.   LUNGS: Questionable ill-defined right perihilar parenchymal opacity. No discrete consolidative parenchymal airspace opacity appreciated. No pneumothorax. No large pleural effusion.   ABDOMEN: Questionable mild colonic gaseous distention of the bowel loops within the upper abdomen.   BONES: ACDF partially visualized. No acute osseous changes.       Examination limited by portable technique and patient rotation. Cardiomegaly with possible mild interstitial edema/vascular congestion. No gross consolidative opacity or large pleural effusion evident.   MACRO: None   Signed by: Sebastian Terrazas 12/4/2023 10:39 AM Dictation workstation:   [id number]        Assessment/Plan   IMP:  Servando Mercado is a 83 y.o. male with past medical history of CKD, hypertension, PVD, AAA repair, GERD, remote injury in 1980s with cervical spine injury and plate placement, and gout is presenting with failure to thrive and altered mental status.  The patient is from SNF, and SNF staff reported the patient has not been eating or drinking for a week and a half, and is normally A&O x 4, but was noted to be A&O x 1; the patient also has a chronic urinary catheter present, and the patient was noted to have hematuria the day before he  "was sent to the ED.  Per notes, the patient was recently treated for UTI.  Significant findings in the emergency room included BUN 63, creatinine 2.49, serum albumin 2.6, hemoglobin 9.8 (chronic), and UA showed red and turbid color with protein, blood, and leukocyte esterase present.  The patient was admitted under the medicine service for dehydration.  Urology was consulted for evaluation of the patient's hematuria, and recommendation was for CT scan without contrast as well as for maintaining the patient's urinary catheter for continued drainage.  Palliative care was consulted to discuss goals of care and advance care planning.    12/6/2023-   The patient did not appear to be in obvious distress, and so no additional medications will be recommended per our service at this time.  The patient has not been eating or drinking, reportedly for a week and a half, and so we will monitor the patient's oral intake as well as for bowel movement, in order to prevent or address constipation if it is noted.  The patient was unable to participate in meaningful conversation.  I did leave a voicemail for the patient's son, Flaquito, requesting a return call in order to discuss goals of care.    1200-   The patient's son, Flaquito, did return my call, and we spoke for approximately 20 minutes regarding goals of care for the patient moving forward.  Flaquito stated the Saturday after Thanksgiving, he arrived to visit with the patient at the care facility, and noticed that the patient was laying in the fetal position, seemed \"feeble\", and was not mentating well; he stated the patient has had worsening mental status and functional ability since then, including not eating or drinking for approximately 1 week.  He stated on Thursday the nursing staff had placed an IV to attempt IV fluid administration, but that the patient had removed the IV himself.  Flaquito stated he felt as though the patient was preparing for death on Friday, and did have last " rights performed on the patient by their ; he reported on Friday of last week that the patient was hallucinating and was delusional.  He did consent for the patient to have a midline placed this past Saturday, and that the patient had been receiving IV fluids since then.  He did state also that the patient was attempting to eat small bites Flaquito provided for him on Saturday, but that the patient has seemed confused, and chewed for much longer than normal without trying to swallow.  He stated the patient is normally A&O x 3 and is able to help reposition himself and to transition from bed to wheelchair; he stated the patient's current state is not normal for him.  I introduced the practice of palliative care and the services it provides.  I then reviewed at length with Flaquito the clinical diagnosis/medical problems that the patient presented with and the treatments provided so far. We discussed the implications of the current circumstances and option plans going forward.  We did discuss the initial complication that the patient has not been eating or drinking, and I did ask Flaquito if he would want the patient to have an artificial feeding tube placed for nutritional supplementation; Flaquito stated the patient himself has previously stated that if he would need a feeding tube, he would want this.  I did discuss with Flaquito typical procedure of initiating tube feed via NG tube temporarily, possibly progressing to surgical PEG tube placement if the patient still does not maintain oral intake, and discussed the risks and benefits of all of the above.  Flaquito stated that he would be receptive to NG tube placement and tube feed initiation if the primary care team did wish to do so.  We did discuss also that a PEG tube, if needed, could possibly be temporary if the patient were to begin eating in the future, and could remain permanent if the patient would not eat.  Flaquito stated he is hoping the patient will have improvement  during this hospitalization, and that if the patient is able to do so, he will want the patient placed back to his care facility and to have rehabilitation services.  We did also discuss CODE STATUS, and Flaquito stated the patient has told him that he would wish to have cardiac resuscitation and full resuscitative measures if needed, but did further state that he would not want to exist long-term on artificial life support; I did clarify with Flaquito that this would maintain the patient's full CODE STATUS, of which he agreed.  We will continue to follow the patient's progress for further goals of care discussions, but currently, Flaquito stated he would approve of having a feeding tube placed and artificial tube feeds started, and does wish to maintain the patient's full CODE STATUS.  I answered his questions and concerns to the best of my ability.  Palliative care will continue to follow.    Thank you for allowing us to participate in the care of this gentleman.  Should you have any further questions or concerns regarding his care, please do not hesitate to contact us via [place] (Tara Naik during weekdays work hours and Tito Sagastume during weekdays after hours and over the weekend)    (This note was generated with voice recognition software and may contain errors including spelling, grammar, syntax and misrecognition of what was dictated, that are not fully corrected)    Patient/proxy preference for information  Prefers full information    Goals of Care  Unknown at this time, awaiting discussion with the patient's son.  The patient remains a full code at this time.    I spent 60 minutes in the professional and overall care of this patient.      Tara Naik, APRN-CNP

## 2023-12-07 NOTE — DOCUMENTATION CLARIFICATION NOTE
"    PATIENT:               PEACE EPPS  ACCT #:                  [id number]  MRN:                       [id number]  :                       1940  ADMIT DATE:       2023 9:38 AM  DISCH DATE:  RESPONDING PROVIDER #:        [id number]          PROVIDER RESPONSE TEXT:    Acute Kidney Injury is ruled out    CDI QUERY TEXT:    UH_CV RIOS        Instruction:    Based on your assessment of the patient and the clinical information, please provide the requested documentation by clicking on the appropriate radio button and enter any additional information if prompted.    Question: Please clarify the diagnosis of Acute Kidney Injury    When answering this query, please exercise your independent professional judgment. The fact that a question is being asked, does not imply that any particular answer is desired or expected.    The patient's clinical indicators include:  Clinical Information: Per H/P  23 by Dr. Manas Gr \"83 y.o. male with past medical history significant for CKD, HTN, PVD, GERD, and gout who presents from [facility] for failure to thrive and altered mental status. According to the nursing notes he has not had any food or water for the past week and a half. Says that patient is normally AO times 4, however today he is AO  times 1.\"    Documented Diagnosis:  Per H/P  23 by Dr. Manas Gr  \"RIOS on CKD\"    Clinical Indicators and Documentation:  -Vital Signs:  23  0946   T 37.2      RR 18   106/65   94 percent room air  -Baseline Creatinine: None documented  -SCr lab values: 23  2.49,   23  2.38,   23  2.07  -Urine Output:  NA  -Electrolyte lab values: 23   K 3.9   Ca 9.4  -Nephrology consult: NA    Treatment: Monitoring labs,  IVF bolus, IVF    Risk Factors: Age, HTN, CKD, PVD, chronic dooley  Options provided:  -- Acute Kidney Injury is ruled out  -- Acute Kidney Injury ruled in as evidenced by, Please specify additional information " below  -- Other - I will add my own diagnosis  -- Refer to Clinical Documentation Reviewer    Query created by: Sheri Faustin on 12/6/2023 12:57 PM      Electronically signed by:  KRISTIN GILMORE DO 12/7/2023 9:46 AM

## 2023-12-07 NOTE — PROGRESS NOTES
"Servando Mercado is a 83 y.o. male on day 1 of admission presenting with Dehydration.    Subjective   No events overnight. Patient seen and examined at bedside. He is slightly more alert today. He has no complaints.       Objective     Physical Exam  Constitutional:       General: He is not in acute distress.     Appearance: He is normal weight.   HENT:      Head: Normocephalic and atraumatic.      Mouth/Throat:      Mouth: Mucous membranes are dry.   Eyes:      Extraocular Movements: Extraocular movements intact.      Conjunctiva/sclera: Conjunctivae normal.      Pupils: Pupils are equal, round, and reactive to light.   Cardiovascular:      Rate and Rhythm: Regular rhythm. Tachycardia present.      Pulses: Normal pulses.   Abdominal:      General: Bowel sounds are normal.      Palpations: Abdomen is soft.      Tenderness: There is no abdominal tenderness.   Musculoskeletal:         General: No swelling.      Right lower leg: No edema.      Left lower leg: No edema.   Skin:     General: Skin is warm and dry.   Neurological:      General: No focal deficit present.   Psychiatric:         Mood and Affect: Mood normal.         Behavior: Behavior normal.     Last Recorded Vitals  Blood pressure 141/79, pulse 99, temperature 37.2 °C (99 °F), temperature source Temporal, resp. rate 20, height 1.778 m (5' 10\"), weight 88 kg (194 lb 0.1 oz), SpO2 95 %.  Intake/Output last 3 Shifts:  I/O last 3 completed shifts:  In: 2211.7 (25.1 mL/kg) [I.V.:2011.7 (22.9 mL/kg); IV Piggyback:200]  Out: 2025 (23 mL/kg) [Urine:2025 (0.6 mL/kg/hr)]  Weight: 88 kg     Relevant Results                This patient has a urinary catheter   Reason for the urinary catheter remaining today? urinary retention/bladder outlet obstruction, acute or chronic     Assessment/Plan   Principal Problem:    Dehydration    Servando Mercado is a 83 y.o. male who presents from [facility] for failure to thrive and altered mental status.  According to the " nursing notes he has not had any food or water for the past week and a half.  Says that patient is normally AO x 4, however today he is AO x 1.  Patient has a chronic Mccoy.  Mccoy catheter has blood present in the bag today.  Recent UTI on 11/30, patient was placed on IV antibiotics until 12/7 per Dr. Valencia.     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  PT/OT for evaluation and treatment  Social work consult for discharge planning  Q 4 vitals  Palliative Care consulted, appreciate recs  Surgery consulted for PEGT placement, discussed with Dr. Monzon     #Hematuria  Hold Plavix and anticoagulation until urine returns to normal  Consult Urology, appreciate recs -- CT a/p     #Elevated troponin  Troponin at 1051 was 80, Repeat at 1316 was 60.  EKG reviewed     #Hypokalemia  Replete K    Continue at home medications as appropriate     Chronic conditions:  #HTN  #PVD  #GERD  #Gout  #CKD     #DVT prophylaxis  SCDs, ambulation as tolerated  Hold anticoagulation due to current bleeding in Mccoy catheter     Dispo: pending timing of PEGT placement, inpatient vs outpatient TBD tomorrow       I spent 45 minutes in the professional and overall care of this patient.      Manas Gr,

## 2023-12-07 NOTE — PROGRESS NOTES
"Servando Mercado is a 83 y.o. male on day 1 of admission presenting with Dehydration.    Subjective   Upon assessment of the patient this morning, the patient was sleeping and did not appear to be in any obvious distress.  The patient has been consuming small amounts of modified diet, as per recommendations of speech therapy.  The patient's urinary catheter bag contained a moderate amount of hazy yellow urine.  The patient's last bowel movement was yesterday.    Objective     Physical Exam  Constitutional:       General: He is not in acute distress.     Appearance: He is ill-appearing.      Comments: Elderly, frail, sleeping  HENT:      Head: Normocephalic and atraumatic.      Nose: Nose normal.      Mouth/Throat:      Mouth: Mucous membranes are moist.      Pharynx: Oropharynx is clear.   Eyes:      Pupils: Pupils are equal, round, and reactive to light.   Cardiovascular:      Rate and Rhythm: Regular rhythm. Tachycardia present.      Heart sounds: Normal heart sounds.   Pulmonary:      Effort: Pulmonary effort is normal.      Breath sounds: Normal breath sounds.   Abdominal:      General: Bowel sounds are normal. There is no distension.      Palpations: Abdomen is soft.      Tenderness: There is no abdominal tenderness. There is no guarding or rebound.   Genitourinary:     Comments: Indwelling urinary catheter present  Musculoskeletal:         General: No deformity.      Cervical back: Normal range of motion.   Skin:     General: Skin is warm and dry.      Coloration: Skin is pale.      Comments: Bilateral feet noted to be slightly reddened and with increased dry skin   Neurological:      Mental Status: Sleeping  Psychiatric:         Comments: Without restlessness    Last Recorded Vitals  Blood pressure 131/67, pulse 97, temperature 37.2 °C (99 °F), temperature source Temporal, resp. rate 20, height 1.778 m (5' 10\"), weight 88 kg (194 lb 0.1 oz), SpO2 97 %.  Intake/Output last 3 Shifts:  I/O last 3 completed " shifts:  In: 2211.7 (25.1 mL/kg) [I.V.:2011.7 (22.9 mL/kg); IV Piggyback:200]  Out: 2025 (23 mL/kg) [Urine:2025 (0.6 mL/kg/hr)]  Weight: 88 kg     Relevant Results  Scheduled medications  balsam peru-castor oiL, , Topical, BID  [Held by provider] clopidogrel, 75 mg, oral, Daily  docusate sodium, 100 mg, oral, BID  famotidine, 20 mg, oral, Daily   Or  famotidine, 20 mg, intravenous, Daily  lidocaine, 1 patch, transdermal, Daily  metoprolol, 5 mg, intravenous, q6h  piperacillin-tazobactam-dextrose, 2.25 g, intravenous, q8h      Continuous medications  dextrose 5 % in water (D5W), 100 mL/hr, Last Rate: 100 mL/hr (12/07/23 0549)      PRN medications  PRN medications: acetaminophen **OR** acetaminophen, bisacodyl, magnesium hydroxide, melatonin, polyethylene glycol, traMADol        Assessment/Plan   IMP:  Servando Mercado is a 83 y.o. male with past medical history of CKD, hypertension, PVD, AAA repair, GERD, remote injury in 1980s with cervical spine injury and plate placement, and gout is presenting with failure to thrive and altered mental status.  The patient is from [facility], and SNF staff reported the patient has not been eating or drinking for a week and a half, and is normally A&O x 4, but was noted to be A&O x 1; the patient also has a chronic urinary catheter present, and the patient was noted to have hematuria the day before he was sent to the ED.  Per notes, the patient was recently treated for UTI.  Significant findings in the emergency room included BUN 63, creatinine 2.49, serum albumin 2.6, hemoglobin 9.8 (chronic), and UA showed red and turbid color with protein, blood, and leukocyte esterase present.  The patient was admitted under the medicine service for dehydration.  Urology was consulted for evaluation of the patient's hematuria, and recommendation was for CT scan without contrast as well as for maintaining the patient's urinary catheter for continued drainage.  Palliative care was consulted to discuss  "goals of care and advance care planning.     12/6/2023-   The patient did not appear to be in obvious distress, and so no additional medications will be recommended per our service at this time.  The patient has not been eating or drinking, reportedly for a week and a half, and so we will monitor the patient's oral intake as well as for bowel movement, in order to prevent or address constipation if it is noted.  The patient was unable to participate in meaningful conversation.  I did leave a voicemail for the patient's son, Flaquito, requesting a return call in order to discuss goals of care.    1200-   The patient's son, Flaquito, did return my call, and we spoke for approximately 20 minutes regarding goals of care for the patient moving forward.  Flaquito stated the Saturday after Thanksgiving, he arrived to visit with the patient at the care facility, and noticed that the patient was laying in the fetal position, seemed \"feeble\", and was not mentating well; he stated the patient has had worsening mental status and functional ability since then, including not eating or drinking for approximately 1 week.  He stated on Thursday the nursing staff had placed an IV to attempt IV fluid administration, but that the patient had removed the IV himself.  Flaquito stated he felt as though the patient was preparing for death on Friday, and did have last rights performed on the patient by their ; he reported on Friday of last week that the patient was hallucinating and was delusional.  He did consent for the patient to have a midline placed this past Saturday, and that the patient had been receiving IV fluids since then.  He did state also that the patient was attempting to eat small bites Flaquito provided for him on Saturday, but that the patient has seemed confused, and chewed for much longer than normal without trying to swallow.  He stated the patient is normally A&O x 3 and is able to help reposition himself and to transition " from bed to wheelchair; he stated the patient's current state is not normal for him.  I introduced the practice of palliative care and the services it provides.  I then reviewed at length with Flaquito the clinical diagnosis/medical problems that the patient presented with and the treatments provided so far. We discussed the implications of the current circumstances and option plans going forward.  We did discuss the initial complication that the patient has not been eating or drinking, and I did ask Flaquito if he would want the patient to have an artificial feeding tube placed for nutritional supplementation; Flaquito stated the patient himself has previously stated that if he would need a feeding tube, he would want this.  I did discuss with Flaquito typical procedure of initiating tube feed via NG tube temporarily, possibly progressing to surgical PEG tube placement if the patient still does not maintain oral intake, and discussed the risks and benefits of all of the above.  Flaquito stated that he would be receptive to NG tube placement and tube feed initiation if the primary care team did wish to do so.  We did discuss also that a PEG tube, if needed, could possibly be temporary if the patient were to begin eating in the future, and could remain permanent if the patient would not eat.  Flaquito stated he is hoping the patient will have improvement during this hospitalization, and that if the patient is able to do so, he will want the patient placed back to his care facility and to have rehabilitation services.  We did also discuss CODE STATUS, and Flaquito stated the patient has told him that he would wish to have cardiac resuscitation and full resuscitative measures if needed, but did further state that he would not want to exist long-term on artificial life support; I did clarify with Flaquito that this would maintain the patient's full CODE STATUS, of which he agreed.  We will continue to follow the patient's progress for further  goals of care discussions, but currently, Flaquito stated he would approve of having a feeding tube placed and artificial tube feeds started, and does wish to maintain the patient's full CODE STATUS.  I answered his questions and concerns to the best of my ability.  Palliative care will continue to follow.    12/7/2023-   The patient did not appear to be in any obvious distress, and so no additional medications will be recommended per our service at this time.  After lengthy discussion with the patient's son yesterday, the patient will remain a full code with plans to return to his care facility and for rehabilitation.  The patient's son also stated that the patient would want to have an artificial feeding tube placed if necessary.  Palliative care will continue to follow.     Thank you for allowing us to participate in the care of this gentleman.  Should you have any further questions or concerns regarding his care, please do not hesitate to contact us via Advise Only (Tara Naik during weekdays work hours and Tito Sagastume during weekdays after hours and over the weekend)     (This note was generated with voice recognition software and may contain errors including spelling, grammar, syntax and misrecognition of what was dictated, that are not fully corrected)    Patient/proxy preference for information  Prefers full information    Goals of Care  The patient remains a full code with plans for rehabilitation and curative measures.         I spent 30 minutes in the professional and overall care of this patient.      Tara Naik, APRN-CNP

## 2023-12-07 NOTE — DOCUMENTATION CLARIFICATION NOTE
"    PATIENT:               PEACE EPPS  ACCT #:                  [id number]  MRN:                       [id number]  :                       1940  ADMIT DATE:       2023 9:38 AM  DISCH DATE:  RESPONDING PROVIDER #:        [id number]          PROVIDER RESPONSE TEXT:    hypernatremia clinically significant, as evidenced by and treated with encephalopathy, treated with IVF    CDI QUERY TEXT:    UH_CV Electrolyte        Instruction:    Based on your assessment of the patient and the clinical information, please provide the requested documentation by clicking on the appropriate radio button and enter any additional information if prompted.    Question: Please clarify the diagnosis of    When answering this query, please exercise your independent professional judgment. The fact that a question is being asked, does not imply that any particular answer is desired or expected.    The patient's clinical indicators include:  Clinical Information: Per H/P  23 by Dr. Manas Gr \"83 y.o. male with past medical history significant for CKD, HTN, PVD, GERD, and gout who presents from [facility] for failure to thrive and altered mental status. According to the nursing notes he has not had any food or water for the past week and a half. Says that patient is normally AO x 4,  however today he is AO x 1.\"    -Documented Diagnosis:  Per progress note 23 by Dr. Manas Gr \"hypernatremia\"  -Laboratory Results: Sodium levels  23  143,   23  146,   23  143  -Clinical Manifestations/Physical Exam Findings: altered mental status    Treatment: Sodium chloride IVF bolus, Monitoring labs    Risk Factors: Age, HTN, CKD, chronic dooley  Options provided:  -- hypernatremia clinically significant, as evidenced by and treated with, Please specify additional information below  -- hypernatremia is ruled out  -- Other - I will add my own diagnosis  -- Refer to Clinical Documentation " Reviewer    Query created by: Sheri Faustin on 12/6/2023 1:03 PM      Electronically signed by:  KRISTIN GILMORE DO 12/7/2023 9:46 AM

## 2023-12-07 NOTE — CARE PLAN
The patient's goals for the shift include      The clinical goals for the shift include Pt. will be able to tolerate 25% of meals by end of shift    Over the shift, the patient was assisted with bite from his meals. Will continue to encourage eating more.

## 2023-12-07 NOTE — PROGRESS NOTES
Medication Adjustment    The following medication(s) was/were adjusted for Servando Mercado per protocol/policy due to altered renal function.    Medication(s) adjusted:   Piperacillin/tazobactam 2.25 g q8h adjusted to 2.25 g q6h per Estimated Creatinine Clearance: 32.7 mL/min (A) (by C-G formula based on SCr of 1.91 mg/dL (H)).    Please call with any questions.  Martina Kraus, PharmD, [facility]  [id number]

## 2023-12-08 PROBLEM — F03.90 DEMENTIA (MULTI): Status: ACTIVE | Noted: 2023-01-01

## 2023-12-08 NOTE — POST-PROCEDURE NOTE
If the patient is altered in any way, an abdominal binder should be placed and remain 24/7 when patient is unmonitored. Removal of the gastrostomy by the patient is a life threatening event in the first 4-6 weeks. If T-fasteners were placed around the gastrostomy tube the biosyn sutures will dissolve or can be cut in 4 weeks, removing the plastic caps from the skin.    Continue gravity drainage to a dooley bag until 9AM the next morning after the procedure. Medications can be given, with temporary clamp of tube after medication administration. Otherwise the tube should be unclamped and to gravity drainage.    At 9AM on the morning after the procedure the patient can be started on tube feeds at 10 cc / hour and advanced by 5 cc every 2 hours until at goal rate. Please consult nutrition for exact tube feed product selection and goal rate.    The patient should remain in the hospital with regular vitals and nursing care for at least the first 24 hours following placement of the gastrostomy tube.    If any evidence of tube dislodgement, tube clogging, leukocytosis or fever, abdominal distension, or aspiration, please reconsult the surgical service immediately. Otherwise no followup is required.

## 2023-12-08 NOTE — PROGRESS NOTES
Speech-Language Pathology                 Therapy Communication Note    Patient Name: Servando Mercado  MRN: [id number]  Today's Date: 12/8/2023     Discipline: Speech Language Pathology    Missed Visit Reason:  Patient NPO for PEG placement this date d/t failure to thrive.  Once p.o. is resumed would offer puree with thin as safest diet per evaluation completed 12/5/23    Missed Time: Attempt

## 2023-12-08 NOTE — PROGRESS NOTES
12/08/23 1000   Discharge Planning   Living Arrangements Other (Comment)  (SNF)   Support Systems Children;Family members   Assistance Needed total care   Type of Residence Nursing home/residential care   Patient expects to be discharged to: return to [facility]   Does the patient need discharge transport arranged? Yes   RoundTrip coordination needed? Yes   Has discharge transport been arranged? No     Patient is unable to participate in therapies due to BLE contractures.  Patient for PEG placement in the very near future.  He is a bedhold at [facility], will not precert to return.  Care Coordination team following.  Brigida JIMENEZ TCC

## 2023-12-08 NOTE — PROGRESS NOTES
SW received referral from palliative care about calling son to talk about outpatient palliative care. Son is not sure if pt needs this additional service while he is at the [facility] and wanted to think about it. Per son request, SW emailed palliative care agency list to him at [email or web address].

## 2023-12-08 NOTE — CONSULTS
Reason For Consult  PEG tube    History Of Present Illness  Servando Mercado is a 83 y.o. male with PMH significant for CKD, HTN, PVD, GERD, and gout who presents from [facility] for failure to thrive and altered mental status on 12/4. Pt was found to be significantly dehydrated form poor PO intake. Surgery was consulted to evaluate for PEG tube placement on 12/8.    Pt passed swallow eval with SLP on 12/6 for modified diet with feeding assistance, but reportedly has had very minimal PO intake. Pt has been seen by palliative care during hospitalization and had ongoing discussions regarding GOC with family. Family wishes pt to remain full code and would like to pursue PEG tube for nutrition. Palliative care and general surgery discussed with pt's son, Flaquito, the risks of the procedure and the need for the PEG tube to remain in place for at least 6 wks prior to removal. Pt is only A&Ox1, and lacks capacity to consent, so consent is obtained from son, Flaquito.      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    Surgical History  He has no past surgical history on file.     Social History  He has no history on file for tobacco use, alcohol use, and drug use.    Family History  No family history on file.     Allergies  Amlodipine, Benazepril, Hydrocodone, Losartan, Statins-hmg-coa reductase inhibitors, Tetracyclic antidepressants, and Tetracycline    Physical Exam  Physical Exam  Constitutional:       General: He is sleeping. He is not in acute distress.     Appearance: He is cachectic.      Comments: Chronically ill appearing, confused, minimally answers questions  "  HENT:      Mouth/Throat:      Mouth: Mucous membranes are dry.   Eyes:      General: No scleral icterus.  Pulmonary:      Effort: Pulmonary effort is normal. No respiratory distress.   Abdominal:      General: Abdomen is flat. There is no distension.      Palpations: Abdomen is soft.      Tenderness: There is no abdominal tenderness.      Comments: Abd exam difficult d/t significant LE contractures. Knees are drawn up to chest, and pt refuses to move legs d/t pain.   Musculoskeletal:      Comments: Bilateral lower extremity contractures. Open wounds on bilateral lower legs, air boots on.   Skin:     General: Skin is warm and dry.      Coloration: Skin is not jaundiced or pale.      Comments: Very dry skin   Neurological:      Mental Status: He is easily aroused. Mental status is at baseline. He is disoriented.         Last Recorded Vitals  Blood pressure 129/62, pulse 89, temperature 36.5 °C (97.7 °F), resp. rate 16, height 1.778 m (5' 10\"), weight 88 kg (194 lb 0.1 oz), SpO2 97 %.       Assessment/Plan     Servando Mercado is a 83 y.o. male with PMH significant for CKD, HTN, PVD, GERD, and gout who presents from [facility] for failure to thrive and altered mental status on 12/4. Pt was found to be significantly dehydrated form poor PO intake. Surgery was consulted to evaluate for PEG tube placement on 12/8.    Impression:  Failure to thrive - normal anorexia at end of life    Recommendations:  - Family wishes to pursue PEG tube placement  - Pt scheduled for procedure today at noon    > chance placement may not be possible d/t intra-abdominal anatomy and/or significant lower extremity contractures  - Pt will need to have an abd binder on at all times to prevent removal of PEG    > removal of PEG within first 6 weeks is potentially life threatening and necessitates an emergent call to surgeon  - Continue mIVF until at TF goal  - Consult nutrition for TF recommendations  - Do not use PEG until surgery assess " site tomorrow  - Okay for PO meds after procedure    Patient discussed with attending surgeon, Dr. Monzon.    I spent 60 minutes in the professional and overall care of this patient.      Dilma Rubin PA-C

## 2023-12-08 NOTE — PROGRESS NOTES
"Servando Mercado is a 83 y.o. male on day 2 of admission presenting with Dehydration.    Subjective   Upon assessment of the patient this morning, the patient was sleeping and did not appear to be in any obvious distress.  The patient does continue to have poor oral intake, and surgery has been consulted for PEG tube placement.  The patient's urinary catheter bag contained a small amount of clear yellow urine.  The patient did produce a bowel movement yesterday.    Objective     Physical Exam  Constitutional:       General: He is not in acute distress.     Appearance: He is ill-appearing.      Comments: Elderly, frail, sleeping  HENT:      Head: Normocephalic and atraumatic.      Nose: Nose normal.      Mouth/Throat:      Mouth: Mucous membranes are moist.      Pharynx: Oropharynx is clear.   Eyes:      Pupils: Pupils are equal, round, and reactive to light.   Cardiovascular:      Rate and Rhythm: Regular rhythm. Tachycardia present.      Heart sounds: Normal heart sounds.   Pulmonary:      Effort: Pulmonary effort is normal.      Breath sounds: Normal breath sounds.   Abdominal:      General: Bowel sounds are normal. There is no distension.      Palpations: Abdomen is soft.      Tenderness: There is no abdominal tenderness. There is no guarding or rebound.   Genitourinary:     Comments: Indwelling urinary catheter present  Musculoskeletal:         General: No deformity.      Cervical back: Normal range of motion.   Skin:     General: Skin is warm and dry.      Coloration: Skin is pale.      Comments: Bilateral feet noted to be slightly reddened and with increased dry skin   Neurological:      Mental Status: Sleeping  Psychiatric:         Comments: Without restlessness    Last Recorded Vitals  Blood pressure 128/62, pulse 103, temperature 36.6 °C (97.9 °F), resp. rate 20, height 1.778 m (5' 10\"), weight 88 kg (194 lb 0.1 oz), SpO2 98 %.  Intake/Output last 3 Shifts:  I/O last 3 completed shifts:  In: 4296.7 (48.8 " mL/kg) [P.O.:50; I.V.:4096.7 (46.6 mL/kg); IV Piggyback:150]  Out: 2025 (23 mL/kg) [Urine:2025 (0.6 mL/kg/hr)]  Weight: 88 kg     Relevant Results  Scheduled medications  balsam peru-castor oiL, , Topical, BID  [Held by provider] clopidogrel, 75 mg, oral, Daily  docusate sodium, 100 mg, oral, BID  famotidine, 20 mg, oral, Daily   Or  famotidine, 20 mg, intravenous, Daily  lidocaine, 1 patch, transdermal, Daily  metoprolol, 5 mg, intravenous, q6h  piperacillin-tazobactam-dextrose, 2.25 g, intravenous, q6h      Continuous medications  dextrose 5 % in water (D5W), 100 mL/hr, Last Rate: 100 mL/hr (12/08/23 0541)      PRN medications  PRN medications: acetaminophen **OR** acetaminophen, bisacodyl, magnesium hydroxide, melatonin, polyethylene glycol, traMADol    Results for orders placed or performed during the hospital encounter of 12/04/23 (from the past 24 hour(s))   Basic metabolic panel   Result Value Ref Range    Glucose 117 (H) 74 - 99 mg/dL    Sodium 137 136 - 145 mmol/L    Potassium 3.3 (L) 3.5 - 5.3 mmol/L    Chloride 110 (H) 98 - 107 mmol/L    Bicarbonate 18 (L) 21 - 32 mmol/L    Anion Gap 12 10 - 20 mmol/L    Urea Nitrogen 41 (H) 6 - 23 mg/dL    Creatinine 1.91 (H) 0.50 - 1.30 mg/dL    eGFR 34 (L) >60 mL/min/1.73m*2    Calcium 8.2 (L) 8.6 - 10.3 mg/dL           Assessment/Plan   IMP:  Servando Mercado is a 83 y.o. male with past medical history of CKD, hypertension, PVD, AAA repair, GERD, remote injury in 1980s with cervical spine injury and plate placement, and gout is presenting with failure to thrive and altered mental status.  The patient is from [facility], and SNF staff reported the patient has not been eating or drinking for a week and a half, and is normally A&O x 4, but was noted to be A&O x 1; the patient also has a chronic urinary catheter present, and the patient was noted to have hematuria the day before he was sent to the ED.  Per notes, the patient was recently treated for UTI.  Significant findings in  "the emergency room included BUN 63, creatinine 2.49, serum albumin 2.6, hemoglobin 9.8 (chronic), and UA showed red and turbid color with protein, blood, and leukocyte esterase present.  The patient was admitted under the medicine service for dehydration.  Urology was consulted for evaluation of the patient's hematuria, and recommendation was for CT scan without contrast as well as for maintaining the patient's urinary catheter for continued drainage.  Palliative care was consulted to discuss goals of care and advance care planning.     12/6/2023-   The patient did not appear to be in obvious distress, and so no additional medications will be recommended per our service at this time.  The patient has not been eating or drinking, reportedly for a week and a half, and so we will monitor the patient's oral intake as well as for bowel movement, in order to prevent or address constipation if it is noted.  The patient was unable to participate in meaningful conversation.  I did leave a voicemail for the patient's son, Flaquito, requesting a return call in order to discuss goals of care.    1200-   The patient's son, Flaquito, did return my call, and we spoke for approximately 20 minutes regarding goals of care for the patient moving forward.  Flaquito stated the Saturday after Thanksgiving, he arrived to visit with the patient at the care facility, and noticed that the patient was laying in the fetal position, seemed \"feeble\", and was not mentating well; he stated the patient has had worsening mental status and functional ability since then, including not eating or drinking for approximately 1 week.  He stated on Thursday the nursing staff had placed an IV to attempt IV fluid administration, but that the patient had removed the IV himself.  Flaquito stated he felt as though the patient was preparing for death on Friday, and did have last rights performed on the patient by their ; he reported on Friday of last week that the " patient was hallucinating and was delusional.  He did consent for the patient to have a midline placed this past Saturday, and that the patient had been receiving IV fluids since then.  He did state also that the patient was attempting to eat small bites Flaquito provided for him on Saturday, but that the patient has seemed confused, and chewed for much longer than normal without trying to swallow.  He stated the patient is normally A&O x 3 and is able to help reposition himself and to transition from bed to wheelchair; he stated the patient's current state is not normal for him.  I introduced the practice of palliative care and the services it provides.  I then reviewed at length with Flaquito the clinical diagnosis/medical problems that the patient presented with and the treatments provided so far. We discussed the implications of the current circumstances and option plans going forward.  We did discuss the initial complication that the patient has not been eating or drinking, and I did ask Flaquito if he would want the patient to have an artificial feeding tube placed for nutritional supplementation; Flaquito stated the patient himself has previously stated that if he would need a feeding tube, he would want this.  I did discuss with Flaquito typical procedure of initiating tube feed via NG tube temporarily, possibly progressing to surgical PEG tube placement if the patient still does not maintain oral intake, and discussed the risks and benefits of all of the above.  Flaquito stated that he would be receptive to NG tube placement and tube feed initiation if the primary care team did wish to do so.  We did discuss also that a PEG tube, if needed, could possibly be temporary if the patient were to begin eating in the future, and could remain permanent if the patient would not eat.  Flaquito stated he is hoping the patient will have improvement during this hospitalization, and that if the patient is able to do so, he will want the  patient placed back to his care facility and to have rehabilitation services.  We did also discuss CODE STATUS, and Flaquito stated the patient has told him that he would wish to have cardiac resuscitation and full resuscitative measures if needed, but did further state that he would not want to exist long-term on artificial life support; I did clarify with Flaquito that this would maintain the patient's full CODE STATUS, of which he agreed.  We will continue to follow the patient's progress for further goals of care discussions, but currently, Flaquito stated he would approve of having a feeding tube placed and artificial tube feeds started, and does wish to maintain the patient's full CODE STATUS.  I answered his questions and concerns to the best of my ability.  Palliative care will continue to follow.     12/7/2023-   The patient did not appear to be in any obvious distress, and so no additional medications will be recommended per our service at this time.  After lengthy discussion with the patient's son yesterday, the patient will remain a full code with plans to return to his care facility and for rehabilitation.  The patient's son also stated that the patient would want to have an artificial feeding tube placed if necessary.  Palliative care will continue to follow.    12/8/2023-   The patient did not appear to be in any obvious distress, and so no additional medications will be recommended per our service at this time.  In line with the patient's and his son's wishes, surgery has been consulted for PEG tube placement.  The patient's son, Flaquito, was initially hesitant regarding PEG tube placement before NG tube attempt, and did verbalize these concerns to the surgical team this morning, but I did speak with him this morning and discussed the decision to pursue PEG tube placement in lieu of NG tube, as it is likely the patient would pull out a NG tube and disrupt his artificial nutrition due to his overall confusion.   Flaquito verbalized understanding of why PEG tube is being pursued initially, and stated this is what the patient would want.  I answered Flaquito's questions and concerns to the best of my ability, and did notify the surgical team that Flaquito does wish to pursue PEG tube placement.  The plan will be for the patient to then return to his care facility, and we do continue to recommend outpatient palliative care for ongoing symptom management.  Palliative care will continue to follow.     Thank you for allowing us to participate in the care of this gentleman.  Should you have any further questions or concerns regarding his care, please do not hesitate to contact us via [employer] (Tara Naik during weekdays work hours and Tito Sagastume during weekdays after hours and over the weekend)     (This note was generated with voice recognition software and may contain errors including spelling, grammar, syntax and misrecognition of what was dictated, that are not fully corrected)    Patient/proxy preference for information  Prefers full information    Goals of Care  The patient does remain a full code at this time, and the patient and family do wish to continue to pursue rehabilitative and curative measures.    Is the patient hospice-eligible?   Yes  Was a discussion held re hospice services?   no  Was a decision made re hospice services?  NA         I spent 30 minutes in the professional and overall care of this patient.      Tara Naik, APRN-CNP

## 2023-12-08 NOTE — ANESTHESIA PREPROCEDURE EVALUATION
Patient: Servando Mercado    Procedure Information       Date/Time: 12/08/23 1200    Scheduled providers: Kolton Monzon MD PhD; Gautam Lang MD    Procedure: EGD    Location: [facility]            Relevant Problems   Anesthesia  Patient disoriented, unable to answer questions      Cardiovascular   (+) Hypertension   (+) PVD (peripheral vascular disease) (CMS/HCC)      GI   (+) Gastroesophageal reflux disease without esophagitis      /Renal   (+) UTI (urinary tract infection)      Neuro/Psych   (+) Dementia (CMS/[facility])      Infectious Disease   (+) UTI (urinary tract infection)       Clinical information reviewed:    Allergies  Meds   Med Hx  Surg Hx   Fam Hx  Soc Hx        NPO Detail:  NPO/Void Status  Date of Last Liquid: 12/07/23  Time of Last Liquid: 2000  Date of Last Solid: 12/07/23  Time of Last Solid: 1800         Physical Exam    Airway  Mallampati: unable to assess     Cardiovascular   Rhythm: regular  Rate: normal     Dental   Comments: Unable to assess   Pulmonary    Abdominal            Anesthesia Plan    ASA 3     MAC     intravenous induction   Anesthetic plan and risks discussed with patient.    Plan discussed with CRNA, CAA and attending.

## 2023-12-08 NOTE — PROGRESS NOTES
"Servando Mercado is a 83 y.o. male on day 2 of admission presenting with Dehydration.    Subjective   83 yom chronic dooley with hematuria  -Dooley to CD with urine clear  -Pt refused to lay flat for the CT scan       Objective     Physical Exam    Last Recorded Vitals  Blood pressure 129/62, pulse 89, temperature 36.5 °C (97.7 °F), resp. rate 16, height 1.778 m (5' 10\"), weight 88 kg (194 lb 0.1 oz), SpO2 97 %.  Intake/Output last 3 Shifts:  I/O last 3 completed shifts:  In: 4296.7 (48.8 mL/kg) [P.O.:50; I.V.:4096.7 (46.6 mL/kg); IV Piggyback:150]  Out: 2025 (23 mL/kg) [Urine:2025 (0.6 mL/kg/hr)]  Weight: 88 kg     Relevant Results  Scheduled medications  balsam peru-castor oiL, , Topical, BID  [Held by provider] clopidogrel, 75 mg, oral, Daily  docusate sodium, 100 mg, oral, BID  famotidine, 20 mg, oral, Daily   Or  famotidine, 20 mg, intravenous, Daily  lidocaine, 1 patch, transdermal, Daily  metoprolol, 5 mg, intravenous, q6h  piperacillin-tazobactam-dextrose, 2.25 g, intravenous, q6h      Continuous medications  dextrose 5 % in water (D5W), 100 mL/hr, Last Rate: 100 mL/hr (12/08/23 0541)      PRN medications  PRN medications: acetaminophen **OR** acetaminophen, bisacodyl, magnesium hydroxide, melatonin, polyethylene glycol, traMADol    Results for orders placed or performed during the hospital encounter of 12/04/23 (from the past 24 hour(s))   Basic metabolic panel   Result Value Ref Range    Glucose 117 (H) 74 - 99 mg/dL    Sodium 137 136 - 145 mmol/L    Potassium 3.3 (L) 3.5 - 5.3 mmol/L    Chloride 110 (H) 98 - 107 mmol/L    Bicarbonate 18 (L) 21 - 32 mmol/L    Anion Gap 12 10 - 20 mmol/L    Urea Nitrogen 41 (H) 6 - 23 mg/dL    Creatinine 1.91 (H) 0.50 - 1.30 mg/dL    eGFR 34 (L) >60 mL/min/1.73m*2    Calcium 8.2 (L) 8.6 - 10.3 mg/dL       CT cervical spine wo IV contrast    Result Date: 12/4/2023  Interpreted By:  Rohan Bynum, STUDY: CT CERVICAL SPINE WO IV CONTRAST;  12/4/2023 11:54 am   INDICATION: " Signs/Symptoms:altered mental status.   COMPARISON: None.   ACCESSION NUMBER(S): [id number]   ORDERING CLINICIAN: TERRY YBARRA   TECHNIQUE: Contiguous axial CT images were obtained at  2 mm slice thickness through the cervical spine without contrast administration. The images were then reconstructed in the coronal and sagittal planes.   FINDINGS: The patient is status post C3-4, C4-5 and C5-6 interbody fusion, with an anterior plate and screw fixation device extending from C3 through C6. There is no CT evidence of acute fracture identified. No prevertebral soft tissue swelling is identified.   ALIGNMENT: There is mild anterolisthesis of C7 on T1 and minimal anterolisthesis of C2 on C3.   VERTEBRAE/DISC SPACES: Severe disc space narrowing and bulky, bridging, osteophyte formation is seen at the C6-7 level. Mild disc space narrowing and bridging osteophyte formation is seen at the C2-3 and C7-T1 levels. Mild-to-moderate facet degenerative changes are seen throughout the cervical spine.   C2-3: Or moderate left-sided neural foraminal narrowing is seen. No significant right foraminal or central canal narrowing is seen.   C3-4: Severe neural foraminal narrowing is seen. At least moderate central canal narrowing is seen.   C4-5: Severe neural foraminal narrowing is seen bilaterally. At least moderate to severe central canal narrowing is seen.   C5-6: Severe neural foraminal narrowing is seen bilaterally. At least moderate central canal narrowing is seen.   C6-7: Severe right-sided and moderate to severe left-sided neural foraminal narrowing is seen. At least moderate to severe central canal narrowing is seen.   C7-T1: Moderate right-sided and moderate to severe left-sided neural foraminal narrowing is seen. At least mild central canal narrowing is seen.   ADDITIONAL FINDINGS: Evaluation of the visualized soft tissues of the neck is limited by the lack of intravenous contrast.  Within this limitation, no gross mass or  lymphadenopathy is identified.       1.  No CT evidence of acute fracture. 2.  Postoperative and degenerative changes throughout the cervical spine, as above.   Signed by: Rohan Bynum 12/4/2023 12:06 PM Dictation workstation:   [id number]    CT head wo IV contrast    Result Date: 12/4/2023  Interpreted By:  Sebastian Terrazas, STUDY: CT HEAD WO IV CONTRAST;  12/4/2023 10:31 am   INDICATION: Signs/Symptoms:Altered mental status.   COMPARISON: None.   ACCESSION NUMBER(S): [id number]   ORDERING CLINICIAN: TERRY YBARRA   TECHNIQUE: Noncontrast axial CT scan of head was performed. Motion artifact mildly degrades assessment.   FINDINGS: Parenchyma: There is no intracranial hemorrhage. The grey-white differentiation is intact. There is no mass effect or midline shift. Moderate chronic small vessel ischemic disease. Remote appearing lacunar infarcts involving the anterior subinsular regions bilaterally. Atherosclerotic calcification of the carotid siphons bilaterally. Small nonspecific calcification within a right parietal sulcus (series 200, image 20).   CSF Spaces: Moderate generalized brain atrophy with disproportionate prominence of the ventricular collecting system, favor advanced central atrophy.   Extra-Axial Fluid: There is no extraaxial fluid collection.   Calvarium: The calvarium is unremarkable.   Paranasal sinuses: Visualized paranasal sinuses are clear.   Mastoids: Clear.   Orbits: Normal.   Soft tissues: Unremarkable.   Other: Partially visualized on this examination is a C1 Andres type burst fracture with 3 dominant ossific fragments. There is an element of spinal stenosis with the thecal sac measuring approximately 8 mm in anterior-posterior dimension (series 3, image 1).       Partially visualized C1 fracture; recommend dedicated CT cervical spine for further evaluation/characterization. Element of spinal stenosis evident.   No acute intracranial hemorrhage, mass effect, or CT apparent acute infarct.    Moderate chronic small vessel ischemic disease.   Moderate brain atrophy with disproportionate prominence of the ventricular collecting system, favor a component of advanced central atrophy.   MACRO: Sebastian Terrazas discussed the significance and urgency of this critical finding by telephone with  TERRY YBARRA on 12/4/2023 at 10:48 am. (**-RCF-**) Findings:  See findings.     Signed by: Sebastian Terrazas 12/4/2023 10:50 AM Dictation workstation:   [id number]    ECG 12 lead    Result Date: 12/4/2023  Ventricular-paced rhythm with occasional Premature ventricular complexes Abnormal ECG No previous ECGs available    XR chest 1 view    Result Date: 12/4/2023  Interpreted By:  Sebastian Terrazas, STUDY: XR CHEST 1 VIEW;  12/4/2023 9:58 am   INDICATION: Signs/Symptoms:altered mental status.   COMPARISON: None.   ACCESSION NUMBER(S): [id number]   ORDERING CLINICIAN: TERRY YBARRA   FINDINGS: AP radiograph of the chest was provided.   DEVICES: Left-sided approach dual lead cardiac pacemaker.   CARDIOMEDIASTINAL SILHOUETTE: Cardiac silhouette appears enlarged which could be exaggerated by patient rotation. Mediastinal contours are grossly within normal limits with aortic atherosclerotic calcification evident.   LUNGS: Questionable ill-defined right perihilar parenchymal opacity. No discrete consolidative parenchymal airspace opacity appreciated. No pneumothorax. No large pleural effusion.   ABDOMEN: Questionable mild colonic gaseous distention of the bowel loops within the upper abdomen.   BONES: ACDF partially visualized. No acute osseous changes.       Examination limited by portable technique and patient rotation. Cardiomegaly with possible mild interstitial edema/vascular congestion. No gross consolidative opacity or large pleural effusion evident.   MACRO: None   Signed by: Sebastian Terrazas 12/4/2023 10:39 AM Dictation workstation:   [id number]                             Assessment/Plan   Principal Problem:     Dehydration    83 yom chronic dooley with hematuria  -Dooley to CD with urine clear  -Pt refused CT yesterday per Radiology.  -He can F/U after DC but will still need CT as well as cystoscopy for a hematuria work-up.        I spent 15 minutes in the professional and overall care of this patient.      Cody Thomas PA-C

## 2023-12-08 NOTE — OP NOTE
EGD / PEG Operative Note     Date: 2023  OR Location: [facility] [place] ENDOSC1    Name: Servando Mercado, : 1940, Age: 83 y.o., MRN: [id number], Sex: male    Diagnosis  * No Diagnosis Codes entered * * No Diagnosis Codes entered *     Procedures  * No procedures documented on diagnosis form *    Surgeons      * Kolton Monzon    Resident/Fellow/Other Assistant:  Surgeon(s) and Role:     * Kolton Monzon MD PhD  Assistant:     * Bright Mahan MD    Procedure Summary  Anesthesia: * No anesthesia type entered *  ASA: III  Anesthesia Staff: Anesthesiologist: Gautam Lang MD  C-AA: KHADIJAH Villalba  Estimated Blood Loss: 2 mL  Intra-op Medications: * Intraprocedure medication information is unavailable because the case start and end events have not been set *           Anesthesia Record               Intraprocedure I/O Totals          Intake    Propofol Drip 0.00 mL    The total shown is the total volume documented since Anesthesia Start was filed.    Total Intake 0 mL          Specimen: No specimens collected     Staff:   No surgical staff documented.         Drains and/or Catheters:   Gastrostomy/Enterostomy 20 Fr. (Active)       Urethral Catheter  20 Fr. (Active)   Site Assessment Clean;Skin intact 23 1127   Collection Container Standard drainage bag 23 112   Securement Method Securing device (Describe) 23   Reason for Continuing Urinary Catheterization chronic urinary retention 23 112   Output (mL) 400 mL 23 214       Tourniquet Times:         Implants:     Findings: Transillumination and 1:1 motion of abdominal wall and stomach at antrum.    Indications: Servando Mercado is an 83 y.o. male who is having surgery for * No pre-op diagnosis entered *.     The patient was seen in the preoperative area. The risks, benefits, complications, treatment options, non-operative alternatives, expected recovery and outcomes were discussed with the patient. The possibilities of reaction  to medication, pulmonary aspiration, injury to surrounding structures, bleeding, recurrent infection, the need for additional procedures, failure to diagnose a condition, and creating a complication requiring transfusion or operation were discussed with the patient. The patient concurred with the proposed plan, giving informed consent.  The site of surgery was properly noted/marked if necessary per policy. The patient has been actively warmed in preoperative area. Preoperative antibiotics have been ordered and given within on floor hours of incision. Venous thrombosis prophylaxis have been ordered including bilateral sequential compression devices    Procedure Details: A preoperative huddle confirmed the patient's identifiers, consent, procedure, and equipment. Moderate sedation and antibiotics were administered. A bite block was placed. The patient was placed supine and upright to expose the stomach and let gravity push the colon inferiorly. After a time out the esophagogastroduodenoscope was introduced without trauma under direct visualization into the esophagus and down into the stomach by Dr. Mahan, which was insufflated.     The patients contractions were gradually loosened and he was placed in supine with the head up. The abdomen was clipped, prepped, and draped. Using digital palpation and also transillumination a site on the distal anterior stomach was found with 1:1 motion on palpation. 1% lidocaine was injected at this site. Three circumferential t-fasteners were placed without difficulty. Using safe track technique a needle was introduced through the abdominal wall and into the stomach with immediate return of gas into the syringe. A snare was used to grasp a wire introduced through the needed and pulled out through the mouth. The 20 FR gastrostomy tube was then threaded back into the GI tract and out the abdominal site where an incision was made. The tube was secured at 2.5 cm from the skin to the  bumper. No bleeding or leakage was present. A 4x4 bandage was placed under the bumper and a binder was applied to the abdomen. The tube was hooked up to gravity drainage using a dooley bag. An abdominal binder was placed. The patient tolerated the procedure well and was returned to the PACU in stable condition. I was present and scrubbed for the entire procedure.    Complications:  None; patient tolerated the procedure well.    Disposition: PACU - hemodynamically stable.  Condition: stable         Additional Details: None    Attending Attestation: I was present and scrubbed for the entire procedure.

## 2023-12-08 NOTE — CARE PLAN
The patient's goals for the shift include      The clinical goals for the shift include no falls    Over the shift, the patient did not make progress toward the following goals. Barriers to progress include poor cognition.

## 2023-12-08 NOTE — PROGRESS NOTES
"Servando Mercdao is a 83 y.o. male on day 2 of admission presenting with Dehydration.    Subjective   No events overnight. Patient getting PEGT today. Discussed with Surgery and Palliative Care.       Objective     Physical Exam  Constitutional:       General: He is not in acute distress.     Appearance: He is normal weight.   HENT:      Head: Normocephalic and atraumatic.      Mouth/Throat:      Mouth: Mucous membranes are dry.   Eyes:      Extraocular Movements: Extraocular movements intact.      Conjunctiva/sclera: Conjunctivae normal.      Pupils: Pupils are equal, round, and reactive to light.   Cardiovascular:      Rate and Rhythm: Regular rhythm. Tachycardia present.      Pulses: Normal pulses.   Abdominal:      General: Bowel sounds are normal.      Palpations: Abdomen is soft.      Tenderness: There is no abdominal tenderness.   Musculoskeletal:         General: No swelling.      Right lower leg: No edema.      Left lower leg: No edema.   Skin:     General: Skin is warm and dry.   Neurological:      General: No focal deficit present.   Psychiatric:         Mood and Affect: Mood normal.         Behavior: Behavior normal.     Last Recorded Vitals  Blood pressure 150/53, pulse 106, temperature 36.9 °C (98.4 °F), resp. rate 16, height 1.778 m (5' 10\"), weight 88 kg (194 lb 0.1 oz), SpO2 98 %.  Intake/Output last 3 Shifts:  I/O last 3 completed shifts:  In: 4296.7 (48.8 mL/kg) [P.O.:50; I.V.:4096.7 (46.6 mL/kg); IV Piggyback:150]  Out: 2025 (23 mL/kg) [Urine:2025 (0.6 mL/kg/hr)]  Weight: 88 kg     Relevant Results                This patient has a urinary catheter   Reason for the urinary catheter remaining today? urinary retention/bladder outlet obstruction, acute or chronic     Assessment/Plan   Principal Problem:    Dehydration  Active Problems:    Dementia (CMS/HCC)    Servando Mercado is a 83 y.o. male who presents from [facility] for failure to thrive and altered mental status.  According to the " nursing notes he has not had any food or water for the past week and a half.  Says that patient is normally AO x 4, however today he is AO x 1.  Patient has a chronic Mccoy.  Mccoy catheter has blood present in the bag today.  Recent UTI on 11/30, patient was placed on IV antibiotics until 12/7 per Dr. Valencia.     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  PT/OT for evaluation and treatment  Social work consult for discharge planning  Q 4 vitals  Palliative Care consulted, appreciate recs  Surgery consulted for PEGT placement, discussed with Dr. Monzon  Surgery and GI placed PEGT 12/8     #Hematuria  Hold Plavix and anticoagulation until urine returns to normal  Consult Urology, appreciate recs -- CT a/p     #Elevated troponin  Troponin at 1051 was 80, Repeat at 1316 was 60.  EKG reviewed     #Hypokalemia  Replete K    Continue at home medications as appropriate     Chronic conditions:  #HTN  #PVD  #GERD  #Gout  #CKD     #DVT prophylaxis  SCDs, ambulation as tolerated  Hold anticoagulation due to current bleeding in Mccoy catheter     Dispo: possible discharge tomorrow       I spent 45 minutes in the professional and overall care of this patient.      Manas Gr DO

## 2023-12-08 NOTE — ANESTHESIA POSTPROCEDURE EVALUATION
Patient: Servando Mercado    Procedure Summary       Date: 12/08/23 Room / Location: [facility]    Anesthesia Start: 1150 Anesthesia Stop: 1233    Procedure: EGD Diagnosis: FTT (failure to thrive) in adult    Scheduled Providers: Kolton Monzon MD PhD; Gautam Lang MD Responsible Provider: Gautam Lang MD    Anesthesia Type: MAC ASA Status: 3            Anesthesia Type: MAC    Vitals Value Taken Time   BP 85/58 12/08/23 1233   Temp 36.2 12/08/23 1233   Pulse 92 12/08/23 1233   Resp 16 12/08/23 1233    99 12/08/23 1233       Anesthesia Post Evaluation    Patient location during evaluation: PACU  Patient participation: complete - patient participated  Level of consciousness: confused  Pain management: satisfactory to patient  Airway patency: patent  Cardiovascular status: acceptable  Respiratory status: acceptable  Hydration status: hypovolemic  Postoperative Nausea and Vomiting: none        No notable events documented.

## 2023-12-09 NOTE — PROGRESS NOTES
"Servando Mercado is a 83 y.o. male on day 3 of admission presenting with Dehydration.    Subjective   83 yom chronic dooley with hematuria  -Dooley to CD with urine clear  -CT was ordered on 12/4/2023 however was not completed until 12/8/2023. Radiologist reported Bilateral hydronephrosis and bladder distention but no comment on the dooley catheter with balloon inflated in the shaft of the penis.      Objective     Physical Exam    Last Recorded Vitals  Blood pressure 110/64, pulse 95, temperature 36.6 °C (97.9 °F), temperature source Temporal, resp. rate 18, height 1.778 m (5' 10\"), weight 88 kg (194 lb 0.1 oz), SpO2 92 %.  Intake/Output last 3 Shifts:  I/O last 3 completed shifts:  In: 2400 (27.3 mL/kg) [I.V.:2200 (25 mL/kg); IV Piggyback:200]  Out: 2050 (23.3 mL/kg) [Urine:2050 (0.6 mL/kg/hr)]  Weight: 88 kg     Relevant Results  Scheduled medications  balsam peru-castor oiL, , Topical, BID  clopidogrel, 75 mg, oral, Daily  docusate sodium, 100 mg, oral, BID  famotidine, 20 mg, oral, Daily   Or  famotidine, 20 mg, intravenous, Daily  lidocaine, 1 patch, transdermal, Daily  metoprolol, 5 mg, intravenous, q6h  piperacillin-tazobactam-dextrose, 2.25 g, intravenous, q6h      Continuous medications  dextrose 5 % in water (D5W), 100 mL/hr, Last Rate: 100 mL/hr (12/09/23 0025)      PRN medications  PRN medications: acetaminophen **OR** acetaminophen, bisacodyl, magnesium hydroxide, melatonin, polyethylene glycol, traMADol    Results for orders placed or performed during the hospital encounter of 12/04/23 (from the past 24 hour(s))   CBC   Result Value Ref Range    WBC 7.3 4.4 - 11.3 x10*3/uL    nRBC 0.0 0.0 - 0.0 /100 WBCs    RBC 3.29 (L) 4.50 - 5.90 x10*6/uL    Hemoglobin 8.7 (L) 13.5 - 17.5 g/dL    Hematocrit 27.5 (L) 41.0 - 52.0 %    MCV 84 80 - 100 fL    MCH 26.4 26.0 - 34.0 pg    MCHC 31.6 (L) 32.0 - 36.0 g/dL    RDW 15.8 (H) 11.5 - 14.5 %    Platelets 222 150 - 450 x10*3/uL   Basic metabolic panel   Result Value Ref " Range    Glucose 110 (H) 74 - 99 mg/dL    Sodium 135 (L) 136 - 145 mmol/L    Potassium 2.9 (LL) 3.5 - 5.3 mmol/L    Chloride 106 98 - 107 mmol/L    Bicarbonate 18 (L) 21 - 32 mmol/L    Anion Gap 14 10 - 20 mmol/L    Urea Nitrogen 29 (H) 6 - 23 mg/dL    Creatinine 1.61 (H) 0.50 - 1.30 mg/dL    eGFR 42 (L) >60 mL/min/1.73m*2    Calcium 8.1 (L) 8.6 - 10.3 mg/dL   Magnesium   Result Value Ref Range    Magnesium 1.37 (L) 1.60 - 2.40 mg/dL   Phosphorus   Result Value Ref Range    Phosphorus 2.3 (L) 2.5 - 4.9 mg/dL       EGD w PEG Tube Placement    Result Date: 12/8/2023  Table formatting from the original result was not included. Impression The esophagus, stomach and duodenum appeared normal. PEG tube placed in the body of the stomach Findings The esophagus, stomach and duodenum appeared normal. A PEG tube measuring 20 Fr was successfully placed in the body of the stomach using a deformable internal bolster via the pull technique after the site was identified via transillumination, visualized indentation and needle passed through abdominal wall; distance from external bolster to external end of tube: 2.5 cm; scope reinserted and tube rotated freely to confirm placement. Gastropexy t-fastners were placed to assist in opposing the stomach to the abdominal wall. Recommendation  PEG tube management as per surgical services.  Indication Cerebrovascular disease Post Procedure Diagnosis Successful PEG tube placement Staff Staff Role Bright Mahan MD Proceduralist Medications dextrose 5 % in water (D5W) infusion 5,511.67 mL*  *From user-documented volume (Totals for administrations occurring from 1141 to 1218 on 12/08/23) Preprocedure A history and physical has been performed, and patient medication allergies have been reviewed. The patient's tolerance of previous anesthesia has been reviewed. The risks and benefits of the procedure and the sedation options and risks were discussed with the patient. All questions were answered and  informed consent obtained. Details of the Procedure The patient underwent monitored anesthesia care, which was administered by the procedural nurse and an anesthesia professional. The patient's blood pressure, ECG, ETCO2, heart rate, level of consciousness, oxygen and respirations were monitored throughout the procedure. The scope was introduced through the mouth and advanced to the second part of the duodenum. Retroflexion was performed in the cardia. Prior to the procedure, the patient's H. Pylori status was unknown. The patient experienced no blood loss. The procedure was not difficult. The patient tolerated the procedure well. There were no apparent adverse events. Antibiotics administered prior to procedure: yes, patient on zosyn therapy. Site prep: Alcohol Patient Positioning: semi-upright   Events Procedure Events Event Event Time ENDO SCOPE IN TIME 12/8/2023 12:05 PM ENDO SCOPE OUT TIME 12/8/2023 12:15 PM Specimens No specimens collected Procedure Location [facility] [address] [facility] [id number] Referring Provider No referring provider defined for this encounter. Procedure Provider No name on file     CT abdomen pelvis wo IV contrast    Result Date: 12/8/2023  Interpreted By:  Nick Stroud, STUDY: CT ABDOMEN PELVIS WO IV CONTRAST;  12/8/2023 8:52 am   INDICATION: 84 y/o   M with  Signs/Symptoms:hematuria abd pain.   LIMITATIONS: Evaluation of the solid organs is limited due to non use of IV contrast. The exam was limited due to patient underlying debility. Portions of the posterior and posterolateral right side of the abdomen and pelvis were excluded because of this. Also, there was considerable artifact in the abdomen and pelvis due to inclusion of the upper and lower extremities in the gantry at the time of image acquisition.   ACCESSION NUMBER(S): [id number]   ORDERING CLINICIAN: JULIA ESPARZA   TECHNIQUE: Thin-section noncontrast spiral axial images were  obtained from the xiphoid down through the symphysis pubis. Sagittal and coronal reconstruction images were generated. Bone, mediastinal, lung, and liver windows were reviewed.   COMPARISON: None.   FINDINGS: LUNG BASES: Thickened band of density obliquely oriented across the right lung base. No mass or pneumonia or pleural effusion in either lung base.. Small pericardial effusion. Cardiac pacemaker wires are evident.   LIVER: No hepatomegaly. Liver density was  within the limits of normal. No gross liver lesion in this unenhaced exam.   GALLBLADDER: Mild-to-moderate cholelithiasis. No gallbladder wall thickening, or adjacent edema.   BILE DUCTS: No intrahepatic biliary ductal dilatation. Common bile duct was within the limits of normal.   SPLEEN: No splenomegaly. No gross splenic mass..   PANCREAS: No pancreatic mass or inflammation, or ductal dilatation.   KIDNEYS/ADRENALS: No adrenal mass or enlargement. There is no gross calcified stone in either kidney nor in either ureter based on this limited exam. There is bilateral hydroureter and bilateral hydronephrosis. There is also an exophytic lateral lower pole right renal cyst measuring 65 x 48 mm on image 82.     BLADDER/PELVIS: Urinary bladder was moderately distended. No gross calcified stone within it. There was air along the anterior wall of the urinary bladder. No uterine enlargement or gross adnexal mass.   GREAT VESSELS/RETROPERITONEUM: There is fusiform aneurysmal dilatation of the infrarenal abdominal aorta measuring up to 78 x 76 mm in axial dimensions. Patient has had aorto bi-iliac graft placement. There is also a stent graft in the proximal left renal artery. Moderate aortoiliac calcifications. No suspicious retroperitoneal adenopathy. No suspicious mesenteric adenopathy. No suspicious pelvic or inguinal adenopathy.   PERITONEUM: No ascites. No pneumoperitoneum. No peritoneal or mesenteric mass or inflammation.   BOWEL: The stomach was empty and  collapsed. There was no small bowel dilatation or small bowel wall thickening. No small-bowel obstruction. Mild diffuse retained colonic stool with scattered colonic gas. There was no gross colonic wall thickening or large bowel obstruction.  No edema adjacent to the colon. Cecal appendix could not be identified.     BONES: No destructive lytic or blastic bone lesion. Severe flexion deformities of the hips. There is bilateral hip joint space loss with spurring, left greater than right. Patient has had multilevel lumbar laminectomies with posterior pedicle screw and fixation susan placement. There are sclerotic arthritic changes in both SI joints. Portions of the pelvic bones were excluded. There is endplate osteophytosis throughout the imaged dorsal spine   ABDOMINAL WALL: Unremarkable.       The exam is limited for multiple reasons as described, including non use of oral or IV contrast, and also due to patient's underlying debility with flexion deformities in the hips and artifact caused by inclusion of the extremities in the gantry at the time of image acquisition. Please see above for details.   Surgical and arthritic changes in the lumbosacral spine as described. 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   Thickened band of atelectasis or scarring across the right lower lobe.   Small pericardial effusion.   No CT evidence renal calculus disease. There is however rather prominent distention of the urinary bladder with bilateral hydroureteronephrosis, suggesting bladder outlet obstruction. There is also air in the urinary bladder which could be iatrogenic or could be due to emphysematous cystitis. Clinical and laboratory correlation are needed.   Cholelithiasis.   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   MACRO: None   Signed by: Nick Stroud 12/8/2023 10:54 AM Dictation workstation:   [id number]    CT cervical spine wo IV contrast    Result Date: 12/4/2023  Interpreted By:   Rohan Bynum, STUDY: CT CERVICAL SPINE WO IV CONTRAST;  12/4/2023 11:54 am   INDICATION: Signs/Symptoms:altered mental status.   COMPARISON: None.   ACCESSION NUMBER(S): [id number]   ORDERING CLINICIAN: TERRY YBARRA   TECHNIQUE: Contiguous axial CT images were obtained at  2 mm slice thickness through the cervical spine without contrast administration. The images were then reconstructed in the coronal and sagittal planes.   FINDINGS: The patient is status post C3-4, C4-5 and C5-6 interbody fusion, with an anterior plate and screw fixation device extending from C3 through C6. There is no CT evidence of acute fracture identified. No prevertebral soft tissue swelling is identified.   ALIGNMENT: There is mild anterolisthesis of C7 on T1 and minimal anterolisthesis of C2 on C3.   VERTEBRAE/DISC SPACES: Severe disc space narrowing and bulky, bridging, osteophyte formation is seen at the C6-7 level. Mild disc space narrowing and bridging osteophyte formation is seen at the C2-3 and C7-T1 levels. Mild-to-moderate facet degenerative changes are seen throughout the cervical spine.   C2-3: Or moderate left-sided neural foraminal narrowing is seen. No significant right foraminal or central canal narrowing is seen.   C3-4: Severe neural foraminal narrowing is seen. At least moderate central canal narrowing is seen.   C4-5: Severe neural foraminal narrowing is seen bilaterally. At least moderate to severe central canal narrowing is seen.   C5-6: Severe neural foraminal narrowing is seen bilaterally. At least moderate central canal narrowing is seen.   C6-7: Severe right-sided and moderate to severe left-sided neural foraminal narrowing is seen. At least moderate to severe central canal narrowing is seen.   C7-T1: Moderate right-sided and moderate to severe left-sided neural foraminal narrowing is seen. At least mild central canal narrowing is seen.   ADDITIONAL FINDINGS: Evaluation of the visualized soft tissues of the neck  is limited by the lack of intravenous contrast.  Within this limitation, no gross mass or lymphadenopathy is identified.       1.  No CT evidence of acute fracture. 2.  Postoperative and degenerative changes throughout the cervical spine, as above.   Signed by: Rohan Bynum 12/4/2023 12:06 PM Dictation workstation:   [id number]    CT head wo IV contrast    Result Date: 12/4/2023  Interpreted By:  Sebastian Terrazas, STUDY: CT HEAD WO IV CONTRAST;  12/4/2023 10:31 am   INDICATION: Signs/Symptoms:Altered mental status.   COMPARISON: None.   ACCESSION NUMBER(S): [id number]   ORDERING CLINICIAN: TERRY YBARRA   TECHNIQUE: Noncontrast axial CT scan of head was performed. Motion artifact mildly degrades assessment.   FINDINGS: Parenchyma: There is no intracranial hemorrhage. The grey-white differentiation is intact. There is no mass effect or midline shift. Moderate chronic small vessel ischemic disease. Remote appearing lacunar infarcts involving the anterior subinsular regions bilaterally. Atherosclerotic calcification of the carotid siphons bilaterally. Small nonspecific calcification within a right parietal sulcus (series 200, image 20).   CSF Spaces: Moderate generalized brain atrophy with disproportionate prominence of the ventricular collecting system, favor advanced central atrophy.   Extra-Axial Fluid: There is no extraaxial fluid collection.   Calvarium: The calvarium is unremarkable.   Paranasal sinuses: Visualized paranasal sinuses are clear.   Mastoids: Clear.   Orbits: Normal.   Soft tissues: Unremarkable.   Other: Partially visualized on this examination is a C1 Andres type burst fracture with 3 dominant ossific fragments. There is an element of spinal stenosis with the thecal sac measuring approximately 8 mm in anterior-posterior dimension (series 3, image 1).       Partially visualized C1 fracture; recommend dedicated CT cervical spine for further evaluation/characterization. Element of spinal  stenosis evident.   No acute intracranial hemorrhage, mass effect, or CT apparent acute infarct.   Moderate chronic small vessel ischemic disease.   Moderate brain atrophy with disproportionate prominence of the ventricular collecting system, favor a component of advanced central atrophy.   MACRO: Sebastian Terrazas discussed the significance and urgency of this critical finding by telephone with  TERRY YBARRA on 12/4/2023 at 10:48 am. (**-RCF-**) Findings:  See findings.     Signed by: Sebastian Terrazas 12/4/2023 10:50 AM Dictation workstation:   [id number]    ECG 12 lead    Result Date: 12/4/2023  Ventricular-paced rhythm with occasional Premature ventricular complexes Abnormal ECG No previous ECGs available    XR chest 1 view    Result Date: 12/4/2023  Interpreted By:  Sebastian Terrazas, STUDY: XR CHEST 1 VIEW;  12/4/2023 9:58 am   INDICATION: Signs/Symptoms:altered mental status.   COMPARISON: None.   ACCESSION NUMBER(S): [id number]   ORDERING CLINICIAN: TERRY YBARRA   FINDINGS: AP radiograph of the chest was provided.   DEVICES: Left-sided approach dual lead cardiac pacemaker.   CARDIOMEDIASTINAL SILHOUETTE: Cardiac silhouette appears enlarged which could be exaggerated by patient rotation. Mediastinal contours are grossly within normal limits with aortic atherosclerotic calcification evident.   LUNGS: Questionable ill-defined right perihilar parenchymal opacity. No discrete consolidative parenchymal airspace opacity appreciated. No pneumothorax. No large pleural effusion.   ABDOMEN: Questionable mild colonic gaseous distention of the bowel loops within the upper abdomen.   BONES: ACDF partially visualized. No acute osseous changes.       Examination limited by portable technique and patient rotation. Cardiomegaly with possible mild interstitial edema/vascular congestion. No gross consolidative opacity or large pleural effusion evident.   MACRO: None   Signed by: Sebastian Terrazas 12/4/2023 10:39 AM Dictation  workstation:   [id number]              PROCEDURE NOTE: AFTER REMOVING THE DOOLEY THAT WAS CLEARLY NOT IN THE BLADDER HAS THE LENGTH OF THE DOOLEY TO THE TIP OF THE PENIS WAS ONLY IN 2 INCHES. PREPPED AND DRAPED IN THE NORMAL FASHION. I ATTEMPTED DOOLEY PLACEMENT BUT UNSUCCESSFUL. I ATTEMPTED USING CATHETER GUIDE ALSO UNSUCCESSFUL. I PLACED A FILIFORM AND DILATED FROM 10FR AFTER CONFIRMATION OF CLEAR URINE TO 20FR. I THEN PLACED 18FR Stebbins TIP CATHETER AND INSTILLED 20CC ns INTO THE BALLOON AND SECURED THE CATHETER TO THE LFT LEG.    HE INITIALLY HAD CLEAR URINE THEN TURN SEDIMENT AND DARK IN COLOR. AFTER HE DRAINED OVER 700CC. I FLUSHED WITH 400CC ns AND URINE REMAINED CLEAR.                Assessment/Plan   Principal Problem:    Dehydration  Active Problems:    Dementia (CMS/HCC)    F/U CHRONIC DOOLEY WITH TRAUMATIC DOOLEY, DIFFICULT INSERTION.  -Maintain the dooley with exchange in 4 weeks.  -manually irrigate as needed to keep urine clear.       I spent 40 minutes in the professional and overall care of this patient.      Cody Thomas PA-C

## 2023-12-09 NOTE — PROGRESS NOTES
"Servando Mercado is a 83 y.o. male on day 3 of admission presenting with Dehydration.    Subjective   Pt is POD1 from Endoscopic PEG placement with Dr. Monzon. PEG site intact with no drainage around insertion site, no erythema or induration. PEG connected to gravity bag with minimal gastric contents.        Objective     Physical Exam  Constitutional:       General: He is not in acute distress.     Appearance: He is not toxic-appearing.   Pulmonary:      Effort: Pulmonary effort is normal. No respiratory distress.   Abdominal:      General: Abdomen is flat. There is no distension.      Palpations: Abdomen is soft.      Tenderness: There is no abdominal tenderness.      Comments: PEG site intact with no drainage around insertion site, no erythema or induration. PEG connected to gravity bag with minimal gastric contents.    Genitourinary:     Comments: Mccoy with clear yellow urine and clots in bag  Musculoskeletal:      Comments: Lower extremities severely contracted   Neurological:      Mental Status: He is alert. Mental status is at baseline.         Last Recorded Vitals  Blood pressure 110/64, pulse 95, temperature 36.6 °C (97.9 °F), temperature source Temporal, resp. rate 18, height 1.778 m (5' 10\"), weight 88 kg (194 lb 0.1 oz), SpO2 92 %.  Intake/Output last 3 Shifts:  I/O last 3 completed shifts:  In: 2400 (27.3 mL/kg) [I.V.:2200 (25 mL/kg); IV Piggyback:200]  Out: 2050 (23.3 mL/kg) [Urine:2050 (0.6 mL/kg/hr)]  Weight: 88 kg     Relevant Results  WBC   Date/Time Value Ref Range Status   12/09/2023 06:16 AM 7.3 4.4 - 11.3 x10*3/uL Final     Hemoglobin   Date/Time Value Ref Range Status   12/09/2023 06:16 AM 8.7 (L) 13.5 - 17.5 g/dL Final     Sodium   Date/Time Value Ref Range Status   12/09/2023 06:16  (L) 136 - 145 mmol/L Final     Potassium   Date/Time Value Ref Range Status   12/09/2023 06:16 AM 2.9 (LL) 3.5 - 5.3 mmol/L Final     Urea Nitrogen   Date/Time Value Ref Range Status   12/09/2023 06:16 AM " 29 (H) 6 - 23 mg/dL Final     Creatinine   Date/Time Value Ref Range Status   12/09/2023 06:16 AM 1.61 (H) 0.50 - 1.30 mg/dL Final       Assessment/Plan   Principal Problem:    Dehydration  Active Problems:    Dementia (CMS/HCC)    Servando Mercado is a 83 y.o. male with PMH significant for CKD, HTN, PVD, GERD, and gout who presents from [facility] for failure to thrive and altered mental status on 12/4. Pt was found to be significantly dehydrated form poor PO intake. Surgery was consulted to evaluate for PEG tube placement on 12/8.     Impression:  Failure to thrive - normal anorexia at end of life    Procedure:  12/8 - Endoscopic PEG placement with Dr. Monzon     Recommendations:  - Okay to use PEG  - Start tube feeds at 10 ml/hr and increase as tolerated    > Consult nutrition for TF recommendations    > Continue mIVF until adequate enteral intake  - May give meds through PEG, but ensure finely crushed and flushed with water  - Pt will need to have an abd binder on at all times to prevent removal of PEG    > removal of PEG within first 6 weeks is potentially life threatening and necessitates an emergent call to surgeon  - Biosyn fixation sutures can be cut in 6 weeks if still attached  - No need for follow-up unless family decides to remove PEG    General surgery will no longer follow. Please reconsult should new needs arise.      Patient discussed with attending surgeon, Dr. Monzon.     I spent 20 minutes in the professional and overall care of this patient.      Dilma Rubin PA-C

## 2023-12-10 NOTE — DISCHARGE SUMMARY
Discharge Diagnosis  Dehydration    Issues Requiring Follow-Up  PEGT, weight loss, failure to thrive    Discharge Meds     Your medication list        START taking these medications        Instructions Last Dose Given Next Dose Due   balsam peru-castor oiL ointment  Commonly known as: Venelex      Apply topically 2 times a day.              CONTINUE taking these medications        Instructions Last Dose Given Next Dose Due   acetaminophen 325 mg tablet  Commonly known as: Tylenol           acetaminophen 650 mg ER tablet  Commonly known as: Tylenol 8 HOUR           ascorbic acid 500 mg tablet  Commonly known as: Vitamin C           clopidogrel 75 mg tablet  Commonly known as: Plavix           docusate sodium 100 mg capsule  Commonly known as: Colace           Dulcolax (bisacodyl) 10 mg suppository  Generic drug: bisacodyl           famotidine 20 mg tablet  Commonly known as: Pepcid           fexofenadine 60 mg tablet  Commonly known as: Allegra           HEPARIN (PORCINE) LOCK FLUSH IV           lidocaine 4 % patch           magnesium hydroxide 400 mg/5 mL suspension  Commonly known as: Milk of Magnesia           melatonin 5 mg tablet           menthol 5 % adhesive patch,medicated           metoprolol tartrate 25 mg tablet  Commonly known as: Lopressor           polyethylene glycol 17 gram packet  Commonly known as: Glycolax, Miralax           traMADol 50 mg tablet  Commonly known as: Ultram                  STOP taking these medications      pseudoephedrine  mg 12 hr tablet  Commonly known as: Sudafed-12 Hour               ASK your doctor about these medications        Instructions Last Dose Given Next Dose Due   Zosyn in dextrose (iso-osm) 2.25 gram/50 mL IV  Generic drug: piperacillin-tazobactam-dextrose  Ask about: Should I take this medication?                     Where to Get Your Medications        Information about where to get these medications is not yet available    Ask your nurse or doctor about  these medications  balsam peru-castor oiL ointment         Test Results Pending At Discharge  Pending Labs       No current pending labs.            Hospital Course   Servando Mercado is a 83 y.o. male who presents from [facility] for failure to thrive and altered mental status.  According to the nursing notes he has not had any food or water for the past week and a half.  Says that patient is normally AO x 4, however today he is AO x 1.  Patient has a chronic Mccoy.  Mccoy catheter has blood present in the bag today.  Recent UTI on 11/30, patient was placed on IV antibiotics until 12/7 per Dr. Valencia.     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  PT/OT for evaluation and treatment  Social work consult for discharge planning  Q 4 vitals  Palliative Care consulted, appreciate recs  Surgery consulted for PEGT placement, discussed with Dr. Monzon  Surgery and GI placed PEGT 12/8     #Hematuria  Hold Plavix and anticoagulation until urine returns to normal  Consult Urology, appreciate recs -- CT a/p     #Elevated troponin  Troponin at 1051 was 80, Repeat at 1316 was 60.  EKG reviewed     #Hypokalemia  Replete K     Continue at home medications as appropriate     Chronic conditions:  #HTN  #PVD  #GERD  #Gout  #CKD     #DVT prophylaxis  SCDs, ambulation as tolerated  Hold anticoagulation due to current bleeding in Mccoy catheter     Dispo: discharge back to [facility], will need dietician eval there for TF recs, continue PT/OT/ST       Pertinent Physical Exam At Time of Discharge  Physical Exam  Constitutional:       General: He is not in acute distress.     Appearance: He is normal weight.   HENT:      Head: Normocephalic and atraumatic.      Mouth/Throat:      Mouth: Mucous membranes are dry.   Eyes:       Extraocular Movements: Extraocular movements intact.      Conjunctiva/sclera: Conjunctivae normal.      Pupils: Pupils are equal, round, and reactive to light.   Cardiovascular:      Rate and Rhythm: Regular rhythm. Tachycardia present.      Pulses: Normal pulses.   Abdominal:      General: Bowel sounds are normal.      Palpations: Abdomen is soft.      Tenderness: There is no abdominal tenderness.   Musculoskeletal:         General: No swelling.      Right lower leg: No edema.      Left lower leg: No edema.   Skin:     General: Skin is warm and dry.   Neurological:      General: No focal deficit present.   Psychiatric:         Mood and Affect: Mood normal.         Behavior: Behavior normal.     Outpatient Follow-Up  No future appointments.      Manas Gr, DO

## 2023-12-10 NOTE — PROGRESS NOTES
"Servando Mercado is a 83 y.o. male on day 4 of admission presenting with Dehydration.    Subjective   F/U hematuria, traumatic dooley and difficult dooley placement  Dooley to CD with urine clear        Objective     Physical Exam    Last Recorded Vitals  Blood pressure 103/53, pulse 80, temperature 35.8 °C (96.4 °F), temperature source Temporal, resp. rate 18, height 1.778 m (5' 10\"), weight 88 kg (194 lb 0.1 oz), SpO2 96 %.  Intake/Output last 3 Shifts:  I/O last 3 completed shifts:  In: 3030 (34.4 mL/kg) [I.V.:1950 (22.2 mL/kg); NG/GT:730; IV Piggyback:350]  Out: 1550 (17.6 mL/kg) [Urine:1300 (0.4 mL/kg/hr); Emesis/NG output:250]  Weight: 88 kg     Relevant Results  Scheduled medications  balsam peru-castor oiL, , Topical, BID  clopidogrel, 75 mg, oral, Daily  docusate sodium, 100 mg, g-tube, BID  famotidine, 20 mg, oral, Daily   Or  famotidine, 20 mg, intravenous, Daily  lidocaine, 1 patch, transdermal, Daily  metoprolol, 5 mg, intravenous, q6h  piperacillin-tazobactam-dextrose, 2.25 g, intravenous, q6h      Continuous medications     PRN medications  PRN medications: acetaminophen **OR** acetaminophen, bisacodyl, magnesium hydroxide, melatonin, polyethylene glycol, traMADol    No results found for this or any previous visit (from the past 24 hour(s)).    EGD w PEG Tube Placement    Result Date: 12/8/2023  Table formatting from the original result was not included. Impression The esophagus, stomach and duodenum appeared normal. PEG tube placed in the body of the stomach Findings The esophagus, stomach and duodenum appeared normal. A PEG tube measuring 20 Fr was successfully placed in the body of the stomach using a deformable internal bolster via the pull technique after the site was identified via transillumination, visualized indentation and needle passed through abdominal wall; distance from external bolster to external end of tube: 2.5 cm; scope reinserted and tube rotated freely to confirm placement. Gastropexy " t-fastners were placed to assist in opposing the stomach to the abdominal wall. Recommendation  PEG tube management as per surgical services.  Indication Cerebrovascular disease Post Procedure Diagnosis Successful PEG tube placement Staff Staff Role Bright Mahan MD Proceduralist Medications dextrose 5 % in water (D5W) infusion 5,511.67 mL*  *From user-documented volume (Totals for administrations occurring from 1141 to 1218 on 12/08/23) Preprocedure A history and physical has been performed, and patient medication allergies have been reviewed. The patient's tolerance of previous anesthesia has been reviewed. The risks and benefits of the procedure and the sedation options and risks were discussed with the patient. All questions were answered and informed consent obtained. Details of the Procedure The patient underwent monitored anesthesia care, which was administered by the procedural nurse and an anesthesia professional. The patient's blood pressure, ECG, ETCO2, heart rate, level of consciousness, oxygen and respirations were monitored throughout the procedure. The scope was introduced through the mouth and advanced to the second part of the duodenum. Retroflexion was performed in the cardia. Prior to the procedure, the patient's H. Pylori status was unknown. The patient experienced no blood loss. The procedure was not difficult. The patient tolerated the procedure well. There were no apparent adverse events. Antibiotics administered prior to procedure: yes, patient on zosyn therapy. Site prep: Alcohol Patient Positioning: semi-upright   Events Procedure Events Event Event Time ENDO SCOPE IN TIME 12/8/2023 12:05 PM ENDO SCOPE OUT TIME 12/8/2023 12:15 PM Specimens No specimens collected Procedure Location [facility] [address] [facility] [id number] Referring Provider No referring provider defined for this encounter. Procedure Provider No name on file     CT abdomen pelvis wo IV  contrast    Result Date: 12/8/2023  Interpreted By:  Nick Stroud, STUDY: CT ABDOMEN PELVIS WO IV CONTRAST;  12/8/2023 8:52 am   INDICATION: 84 y/o   M with  Signs/Symptoms:hematuria abd pain.   LIMITATIONS: Evaluation of the solid organs is limited due to non use of IV contrast. The exam was limited due to patient underlying debility. Portions of the posterior and posterolateral right side of the abdomen and pelvis were excluded because of this. Also, there was considerable artifact in the abdomen and pelvis due to inclusion of the upper and lower extremities in the gantry at the time of image acquisition.   ACCESSION NUMBER(S): [id number]   ORDERING CLINICIAN: JULIA ESPARZA   TECHNIQUE: Thin-section noncontrast spiral axial images were obtained from the xiphoid down through the symphysis pubis. Sagittal and coronal reconstruction images were generated. Bone, mediastinal, lung, and liver windows were reviewed.   COMPARISON: None.   FINDINGS: LUNG BASES: Thickened band of density obliquely oriented across the right lung base. No mass or pneumonia or pleural effusion in either lung base.. Small pericardial effusion. Cardiac pacemaker wires are evident.   LIVER: No hepatomegaly. Liver density was  within the limits of normal. No gross liver lesion in this unenhaced exam.   GALLBLADDER: Mild-to-moderate cholelithiasis. No gallbladder wall thickening, or adjacent edema.   BILE DUCTS: No intrahepatic biliary ductal dilatation. Common bile duct was within the limits of normal.   SPLEEN: No splenomegaly. No gross splenic mass..   PANCREAS: No pancreatic mass or inflammation, or ductal dilatation.   KIDNEYS/ADRENALS: No adrenal mass or enlargement. There is no gross calcified stone in either kidney nor in either ureter based on this limited exam. There is bilateral hydroureter and bilateral hydronephrosis. There is also an exophytic lateral lower pole right renal cyst measuring 65 x 48 mm on image 82.     BLADDER/PELVIS:  Urinary bladder was moderately distended. No gross calcified stone within it. There was air along the anterior wall of the urinary bladder. No uterine enlargement or gross adnexal mass.   GREAT VESSELS/RETROPERITONEUM: There is fusiform aneurysmal dilatation of the infrarenal abdominal aorta measuring up to 78 x 76 mm in axial dimensions. Patient has had aorto bi-iliac graft placement. There is also a stent graft in the proximal left renal artery. Moderate aortoiliac calcifications. No suspicious retroperitoneal adenopathy. No suspicious mesenteric adenopathy. No suspicious pelvic or inguinal adenopathy.   PERITONEUM: No ascites. No pneumoperitoneum. No peritoneal or mesenteric mass or inflammation.   BOWEL: The stomach was empty and collapsed. There was no small bowel dilatation or small bowel wall thickening. No small-bowel obstruction. Mild diffuse retained colonic stool with scattered colonic gas. There was no gross colonic wall thickening or large bowel obstruction.  No edema adjacent to the colon. Cecal appendix could not be identified.     BONES: No destructive lytic or blastic bone lesion. Severe flexion deformities of the hips. There is bilateral hip joint space loss with spurring, left greater than right. Patient has had multilevel lumbar laminectomies with posterior pedicle screw and fixation susan placement. There are sclerotic arthritic changes in both SI joints. Portions of the pelvic bones were excluded. There is endplate osteophytosis throughout the imaged dorsal spine   ABDOMINAL WALL: Unremarkable.       The exam is limited for multiple reasons as described, including non use of oral or IV contrast, and also due to patient's underlying debility with flexion deformities in the hips and artifact caused by inclusion of the extremities in the gantry at the time of image acquisition. Please see above for details.   Surgical and arthritic changes in the lumbosacral spine as described. 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    Thickened band of atelectasis or scarring across the right lower lobe.   Small pericardial effusion.   No CT evidence renal calculus disease. There is however rather prominent distention of the urinary bladder with bilateral hydroureteronephrosis, suggesting bladder outlet obstruction. There is also air in the urinary bladder which could be iatrogenic or could be due to emphysematous cystitis. Clinical and laboratory correlation are needed.   Cholelithiasis.   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   MACRO: None   Signed by: Nick Stroud 12/8/2023 10:54 AM Dictation workstation:   [id number]    CT cervical spine wo IV contrast    Result Date: 12/4/2023  Interpreted By:  Rohan Bynum, STUDY: CT CERVICAL SPINE WO IV CONTRAST;  12/4/2023 11:54 am   INDICATION: Signs/Symptoms:altered mental status.   COMPARISON: None.   ACCESSION NUMBER(S): [id number]   ORDERING CLINICIAN: TERRY YBARRA   TECHNIQUE: Contiguous axial CT images were obtained at  2 mm slice thickness through the cervical spine without contrast administration. The images were then reconstructed in the coronal and sagittal planes.   FINDINGS: The patient is status post C3-4, C4-5 and C5-6 interbody fusion, with an anterior plate and screw fixation device extending from C3 through C6. There is no CT evidence of acute fracture identified. No prevertebral soft tissue swelling is identified.   ALIGNMENT: There is mild anterolisthesis of C7 on T1 and minimal anterolisthesis of C2 on C3.   VERTEBRAE/DISC SPACES: Severe disc space narrowing and bulky, bridging, osteophyte formation is seen at the C6-7 level. Mild disc space narrowing and bridging osteophyte formation is seen at the C2-3 and C7-T1 levels. Mild-to-moderate facet degenerative changes are seen throughout the cervical spine.   C2-3: Or moderate left-sided neural foraminal narrowing is seen. No significant right foraminal or central canal  narrowing is seen.   C3-4: Severe neural foraminal narrowing is seen. At least moderate central canal narrowing is seen.   C4-5: Severe neural foraminal narrowing is seen bilaterally. At least moderate to severe central canal narrowing is seen.   C5-6: Severe neural foraminal narrowing is seen bilaterally. At least moderate central canal narrowing is seen.   C6-7: Severe right-sided and moderate to severe left-sided neural foraminal narrowing is seen. At least moderate to severe central canal narrowing is seen.   C7-T1: Moderate right-sided and moderate to severe left-sided neural foraminal narrowing is seen. At least mild central canal narrowing is seen.   ADDITIONAL FINDINGS: Evaluation of the visualized soft tissues of the neck is limited by the lack of intravenous contrast.  Within this limitation, no gross mass or lymphadenopathy is identified.       1.  No CT evidence of acute fracture. 2.  Postoperative and degenerative changes throughout the cervical spine, as above.   Signed by: Rohan Bynum 12/4/2023 12:06 PM Dictation workstation:   [id number]    CT head wo IV contrast    Result Date: 12/4/2023  Interpreted By:  Sebastian Terrazas, STUDY: CT HEAD WO IV CONTRAST;  12/4/2023 10:31 am   INDICATION: Signs/Symptoms:Altered mental status.   COMPARISON: None.   ACCESSION NUMBER(S): [id number]   ORDERING CLINICIAN: TERRY YBARRA   TECHNIQUE: Noncontrast axial CT scan of head was performed. Motion artifact mildly degrades assessment.   FINDINGS: Parenchyma: There is no intracranial hemorrhage. The grey-white differentiation is intact. There is no mass effect or midline shift. Moderate chronic small vessel ischemic disease. Remote appearing lacunar infarcts involving the anterior subinsular regions bilaterally. Atherosclerotic calcification of the carotid siphons bilaterally. Small nonspecific calcification within a right parietal sulcus (series 200, image 20).   CSF Spaces: Moderate generalized brain atrophy  with disproportionate prominence of the ventricular collecting system, favor advanced central atrophy.   Extra-Axial Fluid: There is no extraaxial fluid collection.   Calvarium: The calvarium is unremarkable.   Paranasal sinuses: Visualized paranasal sinuses are clear.   Mastoids: Clear.   Orbits: Normal.   Soft tissues: Unremarkable.   Other: Partially visualized on this examination is a C1 Andres type burst fracture with 3 dominant ossific fragments. There is an element of spinal stenosis with the thecal sac measuring approximately 8 mm in anterior-posterior dimension (series 3, image 1).       Partially visualized C1 fracture; recommend dedicated CT cervical spine for further evaluation/characterization. Element of spinal stenosis evident.   No acute intracranial hemorrhage, mass effect, or CT apparent acute infarct.   Moderate chronic small vessel ischemic disease.   Moderate brain atrophy with disproportionate prominence of the ventricular collecting system, favor a component of advanced central atrophy.   MACRO: Sebastian Terrazas discussed the significance and urgency of this critical finding by telephone with  TERRY YBARRA on 12/4/2023 at 10:48 am. (**-RCF-**) Findings:  See findings.     Signed by: Sebastian Terrazas 12/4/2023 10:50 AM Dictation workstation:   [id number]    ECG 12 lead    Result Date: 12/4/2023  Ventricular-paced rhythm with occasional Premature ventricular complexes Abnormal ECG No previous ECGs available    XR chest 1 view    Result Date: 12/4/2023  Interpreted By:  Sebastian Terrazas, STUDY: XR CHEST 1 VIEW;  12/4/2023 9:58 am   INDICATION: Signs/Symptoms:altered mental status.   COMPARISON: None.   ACCESSION NUMBER(S): [id number]   ORDERING CLINICIAN: TERRY YBARRA   FINDINGS: AP radiograph of the chest was provided.   DEVICES: Left-sided approach dual lead cardiac pacemaker.   CARDIOMEDIASTINAL SILHOUETTE: Cardiac silhouette appears enlarged which could be exaggerated by patient  rotation. Mediastinal contours are grossly within normal limits with aortic atherosclerotic calcification evident.   LUNGS: Questionable ill-defined right perihilar parenchymal opacity. No discrete consolidative parenchymal airspace opacity appreciated. No pneumothorax. No large pleural effusion.   ABDOMEN: Questionable mild colonic gaseous distention of the bowel loops within the upper abdomen.   BONES: ACDF partially visualized. No acute osseous changes.       Examination limited by portable technique and patient rotation. Cardiomegaly with possible mild interstitial edema/vascular congestion. No gross consolidative opacity or large pleural effusion evident.   MACRO: None   Signed by: Sebastian Terrazas 12/4/2023 10:39 AM Dictation workstation:   [id number]                             Assessment/Plan   Principal Problem:    Dehydration  Active Problems:    Dementia (CMS/HCC)    F/U chronic dooley, traumatic dooley and difficult placement with hematuria  - Maintain the dooley and exchange in 4 weeks.  -Dooley to CD with urine clear  -Will follow at a distant but please call if condition changes       I spent 15 minutes in the professional and overall care of this patient.      Cody Thomas PA-C

## 2023-12-10 NOTE — PROGRESS NOTES
"Servando Mercado is a 83 y.o. male on day 3 of admission presenting with Dehydration.    Subjective   S/p PEGT. Patient seen and examined at bedside. No change from yesterday.       Objective     Physical Exam  Constitutional:       General: He is not in acute distress.     Appearance: He is normal weight.   HENT:      Head: Normocephalic and atraumatic.      Mouth/Throat:      Mouth: Mucous membranes are dry.   Eyes:      Extraocular Movements: Extraocular movements intact.      Conjunctiva/sclera: Conjunctivae normal.      Pupils: Pupils are equal, round, and reactive to light.   Cardiovascular:      Rate and Rhythm: Regular rhythm. Tachycardia present.      Pulses: Normal pulses.   Abdominal:      General: Bowel sounds are normal.      Palpations: Abdomen is soft.      Tenderness: There is no abdominal tenderness.   Musculoskeletal:         General: No swelling.      Right lower leg: No edema.      Left lower leg: No edema.   Skin:     General: Skin is warm and dry.   Neurological:      General: No focal deficit present.   Psychiatric:         Mood and Affect: Mood normal.         Behavior: Behavior normal.     Last Recorded Vitals  Blood pressure 134/69, pulse 95, temperature 36.3 °C (97.3 °F), temperature source Temporal, resp. rate 20, height 1.778 m (5' 10\"), weight 88 kg (194 lb 0.1 oz), SpO2 92 %.  Intake/Output last 3 Shifts:  I/O last 3 completed shifts:  In: 2490 (28.3 mL/kg) [I.V.:2040 (23.2 mL/kg); NG/GT:250; IV Piggyback:200]  Out: 1300 (14.8 mL/kg) [Urine:1050 (0.3 mL/kg/hr); Emesis/NG output:250]  Weight: 88 kg     Relevant Results                This patient has a urinary catheter   Reason for the urinary catheter remaining today? urinary retention/bladder outlet obstruction, acute or chronic     Assessment/Plan   Principal Problem:    Dehydration  Active Problems:    Dementia (CMS/HCC)    Servando Mercado is a 83 y.o. male who presents from [facility] for failure to thrive and altered " mental status.  According to the nursing notes he has not had any food or water for the past week and a half.  Says that patient is normally AO x 4, however today he is AO x 1.  Patient has a chronic Mccoy.  Mccoy catheter has blood present in the bag today.  Recent UTI on 11/30, patient was placed on IV antibiotics until 12/7 per Dr. Valencia.     #Failure to thrive  #Dehydration  #ROIS on CKD  #Recent UTI  #Hypernatremia  Admit for observation and telemetry monitoring  Urine culture with no growth  Continue Zosyn  Normal saline changed to D5W  Swallow evaluation -- pureed, thin liquids  BMP, CBC in a.m.  PT/OT for evaluation and treatment  Social work consult for discharge planning  Q 4 vitals  Palliative Care consulted, appreciate recs  Surgery consulted for PEGT placement, discussed with Dr. Monzon  Surgery and GI placed PEGT 12/8     #Hematuria  Hold Plavix and anticoagulation until urine returns to normal  Consult Urology, appreciate recs -- CT a/p     #Elevated troponin  Troponin at 1051 was 80, Repeat at 1316 was 60.  EKG reviewed     #Hypokalemia  Replete K    Continue at home medications as appropriate     Chronic conditions:  #HTN  #PVD  #GERD  #Gout  #CKD     #DVT prophylaxis  SCDs, ambulation as tolerated  Hold anticoagulation due to current bleeding in Mccoy catheter     Dispo: possible discharge tomorrow       I spent 45 minutes in the professional and overall care of this patient.      Manas Gr DO

## 2023-12-12 NOTE — LETTER
Patient: Servando Mercado  : 1940    Encounter Date: 2023    PROGRESS NOTE    Subjective  Chief complaint: Servando Mercado is a 83 y.o. male who is a long term care patient being seen and evaluated for monthly general medical care and follow-up    HPI:  Patient presents for general medical care and f/u.  Patient seen and examined at bedside.  No issues per nursing.  Patient has no acute complaints. Patient with Dx of HTN, denies chest pain and headache.  Patient with Hx of gout, no recent flairs.  Patient with PVD, denies changes in the skin or decreased temperature. No acute distress. Patient has wounds. Wound doctor has been consulted and will be responsible for the evaluation and comprehensive management of the wound including appropriate control of complicating factors such as unrelieved pressure, infections, vascular and/or uncontrolled metabolic derangement, and/or nutritional deficiency in addition to appropriate debridement. Patient has new PEG tube, dietician is following, patient has poor PO intake.  No new concerns today.  Denies n/v/f/c pain.        Objective  Vital signs: 128/62,96,98%    Physical Exam  Constitutional:       General: He is not in acute distress.  Eyes:      Extraocular Movements: Extraocular movements intact.   Cardiovascular:      Rate and Rhythm: Normal rate and regular rhythm.   Pulmonary:      Effort: Pulmonary effort is normal.      Breath sounds: Normal breath sounds.   Abdominal:      General: Bowel sounds are normal.      Palpations: Abdomen is soft.   Musculoskeletal:      Cervical back: Neck supple.      Right lower leg: No edema.      Left lower leg: No edema.   Neurological:      Mental Status: He is alert.   Psychiatric:         Mood and Affect: Mood normal.         Behavior: Behavior is cooperative.         Assessment/Plan  Problem List Items Addressed This Visit       Hypertension     BP at goal  Continue antihypertensives  Monitor BP           Gout      Stable  Continue allopurinol  Monitor for symptoms         PVD (peripheral vascular disease) (CMS/HCC)     Stable  Continue aspirin & plavix         FTT (failure to thrive) in adult     Poor p.o. intake  Increase TF to 40cc\HR  Dietician following   monitor          Medications, treatments, and labs reviewed  Continue medications and treatments as listed in EMR      Scribe Attestation  Eri NUNEZ Scribe   attest that this documentation has been prepared under the direction and in the presence of Rita Valencia MD.     Provider Attestation - Scribe documentation  All medical record entries made by the Scribe were at my direction and personally dictated by me. I have reviewed the chart and agree that the record accurately reflects my personal performance of the history, physical exam, discussion and plan.   Rita Valencia MD      Electronically Signed By: Rita Valencia MD   12/12/23  5:25 PM

## 2023-12-12 NOTE — PROGRESS NOTES
PROGRESS NOTE    Subjective   Chief complaint: Servando Mercado is a 83 y.o. male who is a long term care patient being seen and evaluated for monthly general medical care and follow-up    HPI:  Patient presents for general medical care and f/u.  Patient seen and examined at bedside.  No issues per nursing.  Patient has no acute complaints. Patient with Dx of HTN, denies chest pain and headache.  Patient with Hx of gout, no recent flairs.  Patient with PVD, denies changes in the skin or decreased temperature. No acute distress. Patient has wounds. Wound doctor has been consulted and will be responsible for the evaluation and comprehensive management of the wound including appropriate control of complicating factors such as unrelieved pressure, infections, vascular and/or uncontrolled metabolic derangement, and/or nutritional deficiency in addition to appropriate debridement. Patient has new PEG tube, dietician is following, patient has poor PO intake.  No new concerns today.  Denies n/v/f/c pain.        Objective   Vital signs: 128/62,96,98%    Physical Exam  Constitutional:       General: He is not in acute distress.  Eyes:      Extraocular Movements: Extraocular movements intact.   Cardiovascular:      Rate and Rhythm: Normal rate and regular rhythm.   Pulmonary:      Effort: Pulmonary effort is normal.      Breath sounds: Normal breath sounds.   Abdominal:      General: Bowel sounds are normal.      Palpations: Abdomen is soft.   Musculoskeletal:      Cervical back: Neck supple.      Right lower leg: No edema.      Left lower leg: No edema.   Neurological:      Mental Status: He is alert.   Psychiatric:         Mood and Affect: Mood normal.         Behavior: Behavior is cooperative.         Assessment/Plan   Problem List Items Addressed This Visit       Hypertension     BP at goal  Continue antihypertensives  Monitor BP           Gout     Stable  Continue allopurinol  Monitor for symptoms         PVD (peripheral  vascular disease) (CMS/HCC)     Stable  Continue aspirin & plavix         FTT (failure to thrive) in adult     Poor p.o. intake  Increase TF to 40cc\HR  Dietician following   monitor          Medications, treatments, and labs reviewed  Continue medications and treatments as listed in EMR      Scribe Attestation  Eri NUNEZ Scribe   attest that this documentation has been prepared under the direction and in the presence of Rita Valencia MD.     Provider Attestation - Scribe documentation  All medical record entries made by the Scribe were at my direction and personally dictated by me. I have reviewed the chart and agree that the record accurately reflects my personal performance of the history, physical exam, discussion and plan.   Rita Valencia MD

## 2023-12-14 NOTE — LETTER
Patient: Servando Mercado  : 1940    Encounter Date: 2023    PROGRESS NOTE    Subjective  Chief complaint: Servando Mercado is a 83 y.o. male who is a long term care patient being seen and evaluated for weakness    HPI:  HPI  Patient is seen for follow-up of patient weak, not eating tube feed recently increased to 40 cc/h, increased today to 50 cc/h due to still not eating and weakness.  Dietitian is following.  Patient seen and examined at bedside, patient minimally responsive at times.    Objective  Vital signs: 137/73, 97.8, 94, 18, 96%    Physical Exam  Constitutional:       General: He is not in acute distress.  Eyes:      Extraocular Movements: Extraocular movements intact.   Pulmonary:      Effort: Pulmonary effort is normal.   Musculoskeletal:      Cervical back: Neck supple.   Neurological:      Mental Status: He is alert.   Psychiatric:         Mood and Affect: Mood normal.         Behavior: Behavior is cooperative.         Assessment/Plan  Problem List Items Addressed This Visit       Hypertension     BP at goal  Continue antihypertensives  Monitor BP           PVD (peripheral vascular disease) (CMS/[facility])     Stable  Continue aspirin & plavix         FTT (failure to thrive) in adult - Primary     Poor p.o. intake  Increase TF to 50cc\HR  Dietician following   monitor          Medications, treatments, and labs reviewed  Continue medications and treatments as listed in EMR    Scribe Attestation  I, Mariana Marquez   attest that this documentation has been prepared under the direction and in the presence of Rita Valencia MD    Provider Attestation - Scribe documentation  All medical record entries made by the Scribe were at my direction and personally dictated by me. I have reviewed the chart and agree that the record accurately reflects my personal performance of the history, physical exam, discussion and plan.   Rita Valencia MD            Electronically Signed By: Rita Valencia MD    12/14/23  5:58 PM

## 2023-12-14 NOTE — PROGRESS NOTES
PROGRESS NOTE    Subjective   Chief complaint: Servando Mercado is a 83 y.o. male who is a long term care patient being seen and evaluated for weakness    HPI:  HPI  Patient is seen for follow-up of patient weak, not eating tube feed recently increased to 40 cc/h, increased today to 50 cc/h due to still not eating and weakness.  Dietitian is following.  Patient seen and examined at bedside, patient minimally responsive at times.    Objective   Vital signs: 137/73, 97.8, 94, 18, 96%    Physical Exam  Constitutional:       General: He is not in acute distress.  Eyes:      Extraocular Movements: Extraocular movements intact.   Pulmonary:      Effort: Pulmonary effort is normal.   Musculoskeletal:      Cervical back: Neck supple.   Neurological:      Mental Status: He is alert.   Psychiatric:         Mood and Affect: Mood normal.         Behavior: Behavior is cooperative.         Assessment/Plan   Problem List Items Addressed This Visit       Hypertension     BP at goal  Continue antihypertensives  Monitor BP           PVD (peripheral vascular disease) (CMS/HCC)     Stable  Continue aspirin & plavix         FTT (failure to thrive) in adult - Primary     Poor p.o. intake  Increase TF to 50cc\HR  Dietician following   monitor          Medications, treatments, and labs reviewed  Continue medications and treatments as listed in EMR    Scribe Attestation  I, Mariana Marquez   attest that this documentation has been prepared under the direction and in the presence of Rita Valencia MD    Provider Attestation - Scribe documentation  All medical record entries made by the Scribe were at my direction and personally dictated by me. I have reviewed the chart and agree that the record accurately reflects my personal performance of the history, physical exam, discussion and plan.   Rita Valencia MD

## 2023-12-15 NOTE — PROGRESS NOTES
PROGRESS NOTE    Subjective   Chief complaint: Servando Mercado is a 83 y.o. male who is a long term care patient being seen and evaluated for follow up.     HPI:  HPI with abnormal lab will need medication adjustment due to increased creatinine level  Objective   Vital signs: 133/78, 97.8, 89, 98%    Physical Exam  Constitutional:       General: He is not in acute distress.  Eyes:      Extraocular Movements: Extraocular movements intact.   Pulmonary:      Effort: Pulmonary effort is normal.   Musculoskeletal:      Cervical back: Neck supple.   Neurological:      Mental Status: He is alert.   Psychiatric:         Mood and Affect: Mood normal.         Behavior: Behavior is cooperative.         Assessment/Plan   Problem List Items Addressed This Visit       RIOS (acute kidney injury) (CMS/[facility]) - Primary     DC allopurinol DC lisinopril monitor lab          Medications, treatments, and labs reviewed  Continue medications and treatments as listed in PCC    Scribe Attestation  Anastasiya NUNEZ Scribe   attest that this documentation has been prepared under the direction and in the presence of JOANNA Louis.    Provider Attestation - Scribe documentation  All medical record entries made by the Scribe were at my direction and personally dictated by me. I have reviewed the chart and agree that the record accurately reflects my personal performance of the history, physical exam, discussion and plan.    JOANNA Louis

## 2023-12-18 PROBLEM — N18.9 CKD (CHRONIC KIDNEY DISEASE): Status: ACTIVE | Noted: 2023-01-01

## 2023-12-18 NOTE — PROGRESS NOTES
PROGRESS NOTE    Subjective   Chief complaint: Servando Mercado is a 83 y.o. male who is a long term care patient being seen and evaluated for change in status.     HPI:  Patient has signed with hospice with notable decline. TF has been discontinued. Comfort medications in place. No new concerns from nursing.       Objective   Vital signs: 122/62,107,88%    Physical Exam  Constitutional:       General: He is not in acute distress.  Eyes:      Extraocular Movements: Extraocular movements intact.   Pulmonary:      Effort: Pulmonary effort is normal.   Musculoskeletal:      Cervical back: Neck supple.   Neurological:      Mental Status: He is alert.   Psychiatric:         Mood and Affect: Mood normal.         Behavior: Behavior is cooperative.         Assessment/Plan   Problem List Items Addressed This Visit       CKD (chronic kidney disease)     Admitted to [facility]            Medications, treatments, and labs reviewed  Continue medications and treatments as listed in EMR      Scribe Attestation  Eri NUNEZ Scribmarlon   attest that this documentation has been prepared under the direction and in the presence of Rita Valencia MD.     Provider Attestation - Scribe documentation  All medical record entries made by the Scribe were at my direction and personally dictated by me. I have reviewed the chart and agree that the record accurately reflects my personal performance of the history, physical exam, discussion and plan.   Rita Valencia MD

## 2023-12-18 NOTE — LETTER
Patient: Servando Mercado  : 1940    Encounter Date: 2023    PROGRESS NOTE    Subjective  Chief complaint: Servando Mercado is a 83 y.o. male who is a long term care patient being seen and evaluated for change in status.     HPI:  Patient has signed with hospice with notable decline. TF has been discontinued. Comfort medications in place. No new concerns from nursing.       Objective  Vital signs: 122/62,107,88%    Physical Exam  Constitutional:       General: He is not in acute distress.  Eyes:      Extraocular Movements: Extraocular movements intact.   Pulmonary:      Effort: Pulmonary effort is normal.   Musculoskeletal:      Cervical back: Neck supple.   Neurological:      Mental Status: He is alert.   Psychiatric:         Mood and Affect: Mood normal.         Behavior: Behavior is cooperative.         Assessment/Plan  Problem List Items Addressed This Visit       CKD (chronic kidney disease)     Admitted to [facility]            Medications, treatments, and labs reviewed  Continue medications and treatments as listed in EMR      Scribe Attestation  IEri Scribe   attest that this documentation has been prepared under the direction and in the presence of Rita Valencia MD.     Provider Attestation - Scribe documentation  All medical record entries made by the Scribe were at my direction and personally dictated by me. I have reviewed the chart and agree that the record accurately reflects my personal performance of the history, physical exam, discussion and plan.   Rita Valencia MD      Electronically Signed By: Rita Valencia MD   23  5:22 PM

## 2023-12-21 PROBLEM — N17.9 AKI (ACUTE KIDNEY INJURY) (CMS-HCC): Status: ACTIVE | Noted: 2023-01-01

## 2023-12-21 NOTE — PROGRESS NOTES
PROGRESS NOTE    Subjective   Chief complaint: Servando Mercado is a 83 y.o. male who is a long term care patient being seen and evaluated for absent vital signs    HPI:  HPI  Patient was on hospice.  Physician in the building and was called to patient's room due to nurse reporting absent vital signs.  Patient was pronounced dead at 8:10 AM.    Objective   Vital signs: Absent vitals    Physical Exam    Assessment/Plan   Problem List Items Addressed This Visit       Death - Primary     Physician pronounced time of death 8:10 AM          Medications, treatments, and labs reviewed  Continue medications and treatments as listed in EMR    Scribe Attestation  I, Sonal Michael Scribe   attest that this documentation has been prepared under the direction and in the presence of Rita Valencia MD    Provider Attestation - Scribe documentation  All medical record entries made by the Scribe were at my direction and personally dictated by me. I have reviewed the chart and agree that the record accurately reflects my personal performance of the history, physical exam, discussion and plan.   Rita Valencia MD

## 2023-12-21 NOTE — LETTER
Patient: Servando Mercado  : 1940    Encounter Date: 2023    PROGRESS NOTE    Subjective  Chief complaint: Servando Mercado is a 83 y.o. male who is a long term care patient being seen and evaluated for absent vital signs    HPI:  HPI  Patient was on hospice.  Physician in the building and was called to patient's room due to nurse reporting absent vital signs.  Patient was pronounced dead at 8:10 AM.    Objective  Vital signs: Absent vitals    Physical Exam    Assessment/Plan  Problem List Items Addressed This Visit       Death - Primary     Physician pronounced time of death 8:10 AM          Medications, treatments, and labs reviewed  Continue medications and treatments as listed in EMR    Scribe Attestation  I, Mariana Marquez   attest that this documentation has been prepared under the direction and in the presence of Rita Valencia MD    Provider Attestation - Scribe documentation  All medical record entries made by the Scribe were at my direction and personally dictated by me. I have reviewed the chart and agree that the record accurately reflects my personal performance of the history, physical exam, discussion and plan.   Rita Valencia MD            Electronically Signed By: Rita Valencia MD   23  7:39 PM
